# Patient Record
Sex: MALE | Race: WHITE | Employment: OTHER | URBAN - NONMETROPOLITAN AREA
[De-identification: names, ages, dates, MRNs, and addresses within clinical notes are randomized per-mention and may not be internally consistent; named-entity substitution may affect disease eponyms.]

---

## 2017-01-27 ENCOUNTER — TELEPHONE (OUTPATIENT)
Dept: CARDIOLOGY | Age: 77
End: 2017-01-27

## 2017-02-08 ENCOUNTER — OFFICE VISIT (OUTPATIENT)
Dept: CARDIOLOGY | Age: 77
End: 2017-02-08
Payer: MEDICARE

## 2017-02-08 VITALS
BODY MASS INDEX: 28.19 KG/M2 | WEIGHT: 186 LBS | SYSTOLIC BLOOD PRESSURE: 180 MMHG | HEIGHT: 68 IN | DIASTOLIC BLOOD PRESSURE: 88 MMHG | HEART RATE: 64 BPM

## 2017-02-08 DIAGNOSIS — I25.10 CORONARY ARTERY DISEASE INVOLVING NATIVE CORONARY ARTERY OF NATIVE HEART WITHOUT ANGINA PECTORIS: ICD-10-CM

## 2017-02-08 DIAGNOSIS — I10 ESSENTIAL HYPERTENSION: Primary | ICD-10-CM

## 2017-02-08 DIAGNOSIS — R55 SYNCOPE, UNSPECIFIED SYNCOPE TYPE: ICD-10-CM

## 2017-02-08 PROCEDURE — 3017F COLORECTAL CA SCREEN DOC REV: CPT | Performed by: CLINICAL NURSE SPECIALIST

## 2017-02-08 PROCEDURE — G8484 FLU IMMUNIZE NO ADMIN: HCPCS | Performed by: CLINICAL NURSE SPECIALIST

## 2017-02-08 PROCEDURE — 4040F PNEUMOC VAC/ADMIN/RCVD: CPT | Performed by: CLINICAL NURSE SPECIALIST

## 2017-02-08 PROCEDURE — 1123F ACP DISCUSS/DSCN MKR DOCD: CPT | Performed by: CLINICAL NURSE SPECIALIST

## 2017-02-08 PROCEDURE — 99203 OFFICE O/P NEW LOW 30 MIN: CPT | Performed by: CLINICAL NURSE SPECIALIST

## 2017-02-08 PROCEDURE — G8427 DOCREV CUR MEDS BY ELIG CLIN: HCPCS | Performed by: CLINICAL NURSE SPECIALIST

## 2017-02-08 PROCEDURE — 93000 ELECTROCARDIOGRAM COMPLETE: CPT | Performed by: CLINICAL NURSE SPECIALIST

## 2017-02-08 PROCEDURE — G8420 CALC BMI NORM PARAMETERS: HCPCS | Performed by: CLINICAL NURSE SPECIALIST

## 2017-02-08 PROCEDURE — G8598 ASA/ANTIPLAT THER USED: HCPCS | Performed by: CLINICAL NURSE SPECIALIST

## 2017-02-08 PROCEDURE — 4004F PT TOBACCO SCREEN RCVD TLK: CPT | Performed by: CLINICAL NURSE SPECIALIST

## 2017-02-08 RX ORDER — HYDROCHLOROTHIAZIDE 25 MG/1
25 TABLET ORAL DAILY
COMMUNITY
Start: 2017-01-20 | End: 2017-02-08 | Stop reason: ALTCHOICE

## 2017-02-08 RX ORDER — FUROSEMIDE 40 MG/1
40 TABLET ORAL DAILY
Qty: 60 TABLET | Refills: 3 | Status: SHIPPED | OUTPATIENT
Start: 2017-02-08 | End: 2017-07-19 | Stop reason: SINTOL

## 2017-02-08 RX ORDER — METOPROLOL SUCCINATE 50 MG/1
50 TABLET, EXTENDED RELEASE ORAL 2 TIMES DAILY
COMMUNITY
Start: 2017-01-20 | End: 2017-07-19 | Stop reason: DRUGHIGH

## 2017-02-08 RX ORDER — GABAPENTIN 300 MG/1
600 CAPSULE ORAL 3 TIMES DAILY
COMMUNITY
Start: 2017-01-20

## 2017-02-08 RX ORDER — GEMFIBROZIL 600 MG/1
600 TABLET, FILM COATED ORAL 2 TIMES DAILY
COMMUNITY
Start: 2017-01-20 | End: 2018-04-17 | Stop reason: CLARIF

## 2017-02-17 ENCOUNTER — TELEPHONE (OUTPATIENT)
Dept: CARDIOLOGY | Age: 77
End: 2017-02-17

## 2017-02-17 DIAGNOSIS — R79.89 ELEVATED SERUM CREATININE: Primary | ICD-10-CM

## 2017-02-21 DIAGNOSIS — R55 SYNCOPE AND COLLAPSE: ICD-10-CM

## 2017-02-21 DIAGNOSIS — I25.10 CORONARY ARTERY DISEASE INVOLVING NATIVE CORONARY ARTERY OF NATIVE HEART WITHOUT ANGINA PECTORIS: Primary | ICD-10-CM

## 2017-02-21 DIAGNOSIS — R55 SYNCOPE, UNSPECIFIED SYNCOPE TYPE: ICD-10-CM

## 2017-02-21 DIAGNOSIS — I10 ESSENTIAL HYPERTENSION: ICD-10-CM

## 2017-02-22 ENCOUNTER — HOSPITAL ENCOUNTER (OUTPATIENT)
Dept: NUCLEAR MEDICINE | Age: 77
Discharge: HOME | End: 2017-02-22
Payer: MEDICARE

## 2017-02-22 ENCOUNTER — HOSPITAL ENCOUNTER (OUTPATIENT)
Dept: NON INVASIVE DIAGNOSTICS | Age: 77
Discharge: HOME | End: 2017-02-22
Payer: MEDICARE

## 2017-02-22 DIAGNOSIS — I25.10 CORONARY ARTERY DISEASE INVOLVING NATIVE CORONARY ARTERY OF NATIVE HEART WITHOUT ANGINA PECTORIS: ICD-10-CM

## 2017-02-22 DIAGNOSIS — I10 ESSENTIAL HYPERTENSION: ICD-10-CM

## 2017-02-22 DIAGNOSIS — R55 SYNCOPE AND COLLAPSE: ICD-10-CM

## 2017-02-22 PROCEDURE — 93017 CV STRESS TEST TRACING ONLY: CPT

## 2017-02-22 PROCEDURE — 78452 HT MUSCLE IMAGE SPECT MULT: CPT

## 2017-02-22 PROCEDURE — 3430000000 HC RX DIAGNOSTIC RADIOPHARMACEUTICAL: Performed by: CLINICAL NURSE SPECIALIST

## 2017-02-22 PROCEDURE — 6360000002 HC RX W HCPCS: Performed by: CLINICAL NURSE SPECIALIST

## 2017-02-22 PROCEDURE — A9500 TC99M SESTAMIBI: HCPCS | Performed by: CLINICAL NURSE SPECIALIST

## 2017-02-22 RX ADMIN — TETRAKIS(2-METHOXYISOBUTYLISOCYANIDE)COPPER(I) TETRAFLUOROBORATE 30 MILLICURIE: 1 INJECTION, POWDER, LYOPHILIZED, FOR SOLUTION INTRAVENOUS at 14:31

## 2017-02-22 RX ADMIN — REGADENOSON 0.4 MG: 0.08 INJECTION, SOLUTION INTRAVENOUS at 14:31

## 2017-02-22 RX ADMIN — TETRAKIS(2-METHOXYISOBUTYLISOCYANIDE)COPPER(I) TETRAFLUOROBORATE 10 MILLICURIE: 1 INJECTION, POWDER, LYOPHILIZED, FOR SOLUTION INTRAVENOUS at 14:31

## 2017-06-13 ENCOUNTER — TELEPHONE (OUTPATIENT)
Dept: CARDIOLOGY | Age: 77
End: 2017-06-13

## 2017-07-19 ENCOUNTER — OFFICE VISIT (OUTPATIENT)
Dept: CARDIOLOGY | Age: 77
End: 2017-07-19
Payer: MEDICARE

## 2017-07-19 VITALS
HEIGHT: 68 IN | BODY MASS INDEX: 27.13 KG/M2 | SYSTOLIC BLOOD PRESSURE: 122 MMHG | DIASTOLIC BLOOD PRESSURE: 66 MMHG | WEIGHT: 179 LBS | HEART RATE: 72 BPM

## 2017-07-19 DIAGNOSIS — R42 DIZZINESS: ICD-10-CM

## 2017-07-19 DIAGNOSIS — I10 ESSENTIAL HYPERTENSION: ICD-10-CM

## 2017-07-19 DIAGNOSIS — I25.10 CORONARY ARTERY DISEASE INVOLVING NATIVE CORONARY ARTERY OF NATIVE HEART WITHOUT ANGINA PECTORIS: Primary | ICD-10-CM

## 2017-07-19 DIAGNOSIS — R94.39 POSITIVE CARDIAC STRESS TEST: ICD-10-CM

## 2017-07-19 PROCEDURE — 99214 OFFICE O/P EST MOD 30 MIN: CPT | Performed by: CLINICAL NURSE SPECIALIST

## 2017-07-19 PROCEDURE — G8598 ASA/ANTIPLAT THER USED: HCPCS | Performed by: CLINICAL NURSE SPECIALIST

## 2017-07-19 PROCEDURE — 3017F COLORECTAL CA SCREEN DOC REV: CPT | Performed by: CLINICAL NURSE SPECIALIST

## 2017-07-19 PROCEDURE — 1123F ACP DISCUSS/DSCN MKR DOCD: CPT | Performed by: CLINICAL NURSE SPECIALIST

## 2017-07-19 PROCEDURE — 1036F TOBACCO NON-USER: CPT | Performed by: CLINICAL NURSE SPECIALIST

## 2017-07-19 PROCEDURE — G8419 CALC BMI OUT NRM PARAM NOF/U: HCPCS | Performed by: CLINICAL NURSE SPECIALIST

## 2017-07-19 PROCEDURE — 4040F PNEUMOC VAC/ADMIN/RCVD: CPT | Performed by: CLINICAL NURSE SPECIALIST

## 2017-07-19 PROCEDURE — G8427 DOCREV CUR MEDS BY ELIG CLIN: HCPCS | Performed by: CLINICAL NURSE SPECIALIST

## 2017-07-19 RX ORDER — FERROUS SULFATE 325(65) MG
325 TABLET ORAL
COMMUNITY

## 2017-07-19 RX ORDER — METOPROLOL SUCCINATE 50 MG/1
50 TABLET, EXTENDED RELEASE ORAL 2 TIMES DAILY
COMMUNITY
End: 2018-03-09

## 2017-07-24 ENCOUNTER — HOSPITAL ENCOUNTER (OUTPATIENT)
Dept: VASCULAR LAB | Age: 77
Discharge: HOME | End: 2017-07-24
Payer: MEDICARE

## 2017-07-24 DIAGNOSIS — I25.10 CORONARY ARTERY DISEASE INVOLVING NATIVE CORONARY ARTERY OF NATIVE HEART WITHOUT ANGINA PECTORIS: ICD-10-CM

## 2017-07-24 DIAGNOSIS — R42 DIZZINESS: ICD-10-CM

## 2017-07-24 PROCEDURE — 93880 EXTRACRANIAL BILAT STUDY: CPT

## 2017-10-27 ENCOUNTER — TELEPHONE (OUTPATIENT)
Dept: CARDIOLOGY | Age: 77
End: 2017-10-27

## 2017-10-30 ENCOUNTER — TELEPHONE (OUTPATIENT)
Dept: CARDIOLOGY | Age: 77
End: 2017-10-30

## 2018-01-11 ENCOUNTER — HOSPITAL ENCOUNTER (OUTPATIENT)
Age: 78
Setting detail: OBSERVATION
Discharge: HOME | End: 2018-01-15
Attending: INTERNAL MEDICINE | Admitting: INTERNAL MEDICINE
Payer: MEDICARE

## 2018-01-11 DIAGNOSIS — N19 RENAL FAILURE, UNSPECIFIED CHRONICITY: ICD-10-CM

## 2018-01-11 LAB
GLUCOSE BLD-MCNC: 194 MG/DL (ref 70–99)
PERFORMED ON: ABNORMAL
TROPONIN: 0.1 NG/ML (ref 0–0.03)

## 2018-01-11 PROCEDURE — 2140000000 HC CCU INTERMEDIATE R&B

## 2018-01-11 PROCEDURE — 84484 ASSAY OF TROPONIN QUANT: CPT

## 2018-01-11 PROCEDURE — 93005 ELECTROCARDIOGRAM TRACING: CPT

## 2018-01-11 PROCEDURE — 2580000003 HC RX 258: Performed by: INTERNAL MEDICINE

## 2018-01-11 PROCEDURE — 6370000000 HC RX 637 (ALT 250 FOR IP): Performed by: INTERNAL MEDICINE

## 2018-01-11 PROCEDURE — 99220 PR INITIAL OBSERVATION CARE/DAY 70 MINUTES: CPT | Performed by: INTERNAL MEDICINE

## 2018-01-11 PROCEDURE — 36415 COLL VENOUS BLD VENIPUNCTURE: CPT

## 2018-01-11 PROCEDURE — 82948 REAGENT STRIP/BLOOD GLUCOSE: CPT

## 2018-01-11 RX ORDER — ERGOCALCIFEROL 1.25 MG/1
50000 CAPSULE ORAL WEEKLY
Status: DISCONTINUED | OUTPATIENT
Start: 2018-01-17 | End: 2018-01-15 | Stop reason: HOSPADM

## 2018-01-11 RX ORDER — ONDANSETRON 2 MG/ML
4 INJECTION INTRAMUSCULAR; INTRAVENOUS EVERY 6 HOURS PRN
Status: DISCONTINUED | OUTPATIENT
Start: 2018-01-11 | End: 2018-01-15 | Stop reason: HOSPADM

## 2018-01-11 RX ORDER — METOPROLOL SUCCINATE 50 MG/1
50 TABLET, EXTENDED RELEASE ORAL 2 TIMES DAILY
Status: DISCONTINUED | OUTPATIENT
Start: 2018-01-11 | End: 2018-01-15 | Stop reason: HOSPADM

## 2018-01-11 RX ORDER — FERROUS SULFATE 325(65) MG
325 TABLET ORAL
Status: DISCONTINUED | OUTPATIENT
Start: 2018-01-12 | End: 2018-01-15 | Stop reason: HOSPADM

## 2018-01-11 RX ORDER — ASPIRIN 81 MG/1
81 TABLET, CHEWABLE ORAL DAILY
Status: DISCONTINUED | OUTPATIENT
Start: 2018-01-12 | End: 2018-01-11 | Stop reason: SDUPTHER

## 2018-01-11 RX ORDER — ERGOCALCIFEROL 1.25 MG/1
50000 CAPSULE ORAL WEEKLY
COMMUNITY

## 2018-01-11 RX ORDER — GABAPENTIN 600 MG/1
600 TABLET ORAL 3 TIMES DAILY
Status: DISCONTINUED | OUTPATIENT
Start: 2018-01-11 | End: 2018-01-15 | Stop reason: HOSPADM

## 2018-01-11 RX ORDER — SODIUM CHLORIDE 0.9 % (FLUSH) 0.9 %
10 SYRINGE (ML) INJECTION EVERY 12 HOURS SCHEDULED
Status: DISCONTINUED | OUTPATIENT
Start: 2018-01-11 | End: 2018-01-15 | Stop reason: HOSPADM

## 2018-01-11 RX ORDER — ASPIRIN 81 MG/1
81 TABLET ORAL DAILY
Status: DISCONTINUED | OUTPATIENT
Start: 2018-01-12 | End: 2018-01-15 | Stop reason: HOSPADM

## 2018-01-11 RX ORDER — ACETAMINOPHEN 325 MG/1
650 TABLET ORAL EVERY 4 HOURS PRN
Status: DISCONTINUED | OUTPATIENT
Start: 2018-01-11 | End: 2018-01-15 | Stop reason: HOSPADM

## 2018-01-11 RX ORDER — LISINOPRIL 20 MG/1
40 TABLET ORAL DAILY
Status: DISCONTINUED | OUTPATIENT
Start: 2018-01-12 | End: 2018-01-15 | Stop reason: HOSPADM

## 2018-01-11 RX ORDER — SIMVASTATIN 40 MG
40 TABLET ORAL NIGHTLY
Status: DISCONTINUED | OUTPATIENT
Start: 2018-01-11 | End: 2018-01-15 | Stop reason: HOSPADM

## 2018-01-11 RX ORDER — GEMFIBROZIL 600 MG/1
600 TABLET, FILM COATED ORAL 2 TIMES DAILY
Status: DISCONTINUED | OUTPATIENT
Start: 2018-01-11 | End: 2018-01-15 | Stop reason: HOSPADM

## 2018-01-11 RX ORDER — METOCLOPRAMIDE 10 MG/1
10 TABLET ORAL
COMMUNITY

## 2018-01-11 RX ORDER — SODIUM CHLORIDE 0.9 % (FLUSH) 0.9 %
10 SYRINGE (ML) INJECTION PRN
Status: DISCONTINUED | OUTPATIENT
Start: 2018-01-11 | End: 2018-01-15 | Stop reason: HOSPADM

## 2018-01-11 RX ORDER — METOCLOPRAMIDE 10 MG/1
10 TABLET ORAL
Status: DISCONTINUED | OUTPATIENT
Start: 2018-01-11 | End: 2018-01-15 | Stop reason: HOSPADM

## 2018-01-11 RX ADMIN — METOCLOPRAMIDE HYDROCHLORIDE 10 MG: 10 TABLET ORAL at 21:55

## 2018-01-11 RX ADMIN — GEMFIBROZIL 600 MG: 600 TABLET ORAL at 21:55

## 2018-01-11 RX ADMIN — GABAPENTIN 600 MG: 600 TABLET, FILM COATED ORAL at 21:55

## 2018-01-11 RX ADMIN — METOPROLOL SUCCINATE 50 MG: 50 TABLET, EXTENDED RELEASE ORAL at 21:55

## 2018-01-11 RX ADMIN — SIMVASTATIN 40 MG: 40 TABLET, FILM COATED ORAL at 22:03

## 2018-01-11 RX ADMIN — Medication 10 ML: at 21:55

## 2018-01-11 ASSESSMENT — PAIN SCALES - GENERAL: PAINLEVEL_OUTOF10: 0

## 2018-01-12 PROBLEM — R07.9 CHEST PAIN: Status: ACTIVE | Noted: 2018-01-12

## 2018-01-12 LAB
ALBUMIN SERPL-MCNC: 4.3 G/DL (ref 3.5–5.2)
ALP BLD-CCNC: 83 U/L (ref 40–130)
ALT SERPL-CCNC: 9 U/L (ref 5–41)
ANION GAP SERPL CALCULATED.3IONS-SCNC: 16 MMOL/L (ref 7–19)
AST SERPL-CCNC: 16 U/L (ref 5–40)
BACTERIA: NEGATIVE /HPF
BASOPHILS ABSOLUTE: 0 K/UL (ref 0–0.2)
BASOPHILS RELATIVE PERCENT: 0.4 % (ref 0–1)
BILIRUB SERPL-MCNC: 0.5 MG/DL (ref 0.2–1.2)
BILIRUBIN URINE: NEGATIVE
BLOOD, URINE: ABNORMAL
BUN BLDV-MCNC: 53 MG/DL (ref 8–23)
CALCIUM SERPL-MCNC: 9.6 MG/DL (ref 8.8–10.2)
CHLORIDE BLD-SCNC: 99 MMOL/L (ref 98–111)
CHOLESTEROL, TOTAL: 208 MG/DL (ref 160–199)
CLARITY: CLEAR
CO2: 22 MMOL/L (ref 22–29)
COLOR: YELLOW
CREAT SERPL-MCNC: 1.6 MG/DL (ref 0.5–1.2)
CREATININE URINE: 42.1 MG/DL (ref 4.2–622)
EKG P AXIS: 62 DEGREES
EKG P-R INTERVAL: 208 MS
EKG Q-T INTERVAL: 408 MS
EKG QRS DURATION: 104 MS
EKG QTC CALCULATION (BAZETT): 436 MS
EKG T AXIS: 133 DEGREES
EOSINOPHILS ABSOLUTE: 0.2 K/UL (ref 0–0.6)
EOSINOPHILS RELATIVE PERCENT: 2.1 % (ref 0–5)
EPITHELIAL CELLS, UA: 0 /HPF (ref 0–5)
GFR NON-AFRICAN AMERICAN: 42
GLUCOSE BLD-MCNC: 236 MG/DL (ref 74–109)
GLUCOSE URINE: 100 MG/DL
HBA1C MFR BLD: 9.9 %
HCT VFR BLD CALC: 35.5 % (ref 42–52)
HDLC SERPL-MCNC: 34 MG/DL (ref 55–121)
HEMOGLOBIN: 11.2 G/DL (ref 14–18)
HYALINE CASTS: 0 /HPF (ref 0–8)
INR BLD: 1.18 (ref 0.88–1.18)
KETONES, URINE: NEGATIVE MG/DL
LDL CHOLESTEROL CALCULATED: ABNORMAL MG/DL
LDL CHOLESTEROL DIRECT: 123 MG/DL
LEUKOCYTE ESTERASE, URINE: NEGATIVE
LV EF: 43 %
LVEF MODALITY: NORMAL
LYMPHOCYTES ABSOLUTE: 2.1 K/UL (ref 1.1–4.5)
LYMPHOCYTES RELATIVE PERCENT: 27.1 % (ref 20–40)
MCH RBC QN AUTO: 28.1 PG (ref 27–31)
MCHC RBC AUTO-ENTMCNC: 31.5 G/DL (ref 33–37)
MCV RBC AUTO: 89.2 FL (ref 80–94)
MICROALBUMIN UR-MCNC: 6.8 MG/DL (ref 0–19)
MICROALBUMIN/CREAT UR-RTO: 161.5 MG/G
MONOCYTES ABSOLUTE: 0.5 K/UL (ref 0–0.9)
MONOCYTES RELATIVE PERCENT: 7 % (ref 0–10)
NEUTROPHILS ABSOLUTE: 4.8 K/UL (ref 1.5–7.5)
NEUTROPHILS RELATIVE PERCENT: 62.9 % (ref 50–65)
NITRITE, URINE: NEGATIVE
PDW BLD-RTO: 13.6 % (ref 11.5–14.5)
PH UA: 5.5
PLATELET # BLD: 231 K/UL (ref 130–400)
PMV BLD AUTO: 11.4 FL (ref 9.4–12.4)
POTASSIUM SERPL-SCNC: 5.3 MMOL/L (ref 3.5–5)
PRO-BNP: 3401 PG/ML (ref 0–1800)
PROTEIN UA: NEGATIVE MG/DL
PROTHROMBIN TIME: 15 SEC (ref 12–14.6)
RBC # BLD: 3.98 M/UL (ref 4.7–6.1)
RBC UA: 3 /HPF (ref 0–4)
SEDIMENTATION RATE, ERYTHROCYTE: 86 MM/HR (ref 0–15)
SODIUM BLD-SCNC: 137 MMOL/L (ref 136–145)
SODIUM URINE: 39 MMOL/L
SPECIFIC GRAVITY UA: 1.01
TOTAL CK: 158 U/L (ref 39–308)
TOTAL PROTEIN: 7.5 G/DL (ref 6.6–8.7)
TRIGL SERPL-MCNC: 404 MG/DL (ref 0–149)
TROPONIN: 0.08 NG/ML (ref 0–0.03)
TROPONIN: 0.1 NG/ML (ref 0–0.03)
TSH SERPL DL<=0.05 MIU/L-ACNC: 0.56 UIU/ML (ref 0.27–4.2)
UREA NITROGEN, UR: 425 MG/DL
UROBILINOGEN, URINE: 0.2 E.U./DL
VITAMIN B-12: 441 PG/ML (ref 211–946)
WBC # BLD: 7.6 K/UL (ref 4.8–10.8)
WBC UA: 0 /HPF (ref 0–5)

## 2018-01-12 PROCEDURE — 84300 ASSAY OF URINE SODIUM: CPT

## 2018-01-12 PROCEDURE — 82550 ASSAY OF CK (CPK): CPT

## 2018-01-12 PROCEDURE — 81405 MOPATH PROCEDURE LEVEL 6: CPT

## 2018-01-12 PROCEDURE — 93005 ELECTROCARDIOGRAM TRACING: CPT

## 2018-01-12 PROCEDURE — 83880 ASSAY OF NATRIURETIC PEPTIDE: CPT

## 2018-01-12 PROCEDURE — 84540 ASSAY OF URINE/UREA-N: CPT

## 2018-01-12 PROCEDURE — 99205 OFFICE O/P NEW HI 60 MIN: CPT | Performed by: PSYCHIATRY & NEUROLOGY

## 2018-01-12 PROCEDURE — 90686 IIV4 VACC NO PRSV 0.5 ML IM: CPT | Performed by: INTERNAL MEDICINE

## 2018-01-12 PROCEDURE — 97162 PT EVAL MOD COMPLEX 30 MIN: CPT

## 2018-01-12 PROCEDURE — 84484 ASSAY OF TROPONIN QUANT: CPT

## 2018-01-12 PROCEDURE — 82570 ASSAY OF URINE CREATININE: CPT

## 2018-01-12 PROCEDURE — 90670 PCV13 VACCINE IM: CPT | Performed by: INTERNAL MEDICINE

## 2018-01-12 PROCEDURE — 99225 PR SBSQ OBSERVATION CARE/DAY 25 MINUTES: CPT | Performed by: INTERNAL MEDICINE

## 2018-01-12 PROCEDURE — 85025 COMPLETE CBC W/AUTO DIFF WBC: CPT

## 2018-01-12 PROCEDURE — 6360000002 HC RX W HCPCS: Performed by: INTERNAL MEDICINE

## 2018-01-12 PROCEDURE — 82607 VITAMIN B-12: CPT

## 2018-01-12 PROCEDURE — 6370000000 HC RX 637 (ALT 250 FOR IP): Performed by: INTERNAL MEDICINE

## 2018-01-12 PROCEDURE — 93306 TTE W/DOPPLER COMPLETE: CPT

## 2018-01-12 PROCEDURE — 2580000003 HC RX 258: Performed by: INTERNAL MEDICINE

## 2018-01-12 PROCEDURE — 85610 PROTHROMBIN TIME: CPT

## 2018-01-12 PROCEDURE — 81406 MOPATH PROCEDURE LEVEL 7: CPT

## 2018-01-12 PROCEDURE — G8978 MOBILITY CURRENT STATUS: HCPCS

## 2018-01-12 PROCEDURE — 6370000000 HC RX 637 (ALT 250 FOR IP): Performed by: PSYCHIATRY & NEUROLOGY

## 2018-01-12 PROCEDURE — 85652 RBC SED RATE AUTOMATED: CPT

## 2018-01-12 PROCEDURE — G0378 HOSPITAL OBSERVATION PER HR: HCPCS

## 2018-01-12 PROCEDURE — 83921 ORGANIC ACID SINGLE QUANT: CPT

## 2018-01-12 PROCEDURE — 83721 ASSAY OF BLOOD LIPOPROTEIN: CPT

## 2018-01-12 PROCEDURE — 81001 URINALYSIS AUTO W/SCOPE: CPT

## 2018-01-12 PROCEDURE — 81403 MOPATH PROCEDURE LEVEL 4: CPT

## 2018-01-12 PROCEDURE — 81324 PMP22 GENE DUP/DELET: CPT

## 2018-01-12 PROCEDURE — 80061 LIPID PANEL: CPT

## 2018-01-12 PROCEDURE — G8979 MOBILITY GOAL STATUS: HCPCS

## 2018-01-12 PROCEDURE — 36415 COLL VENOUS BLD VENIPUNCTURE: CPT

## 2018-01-12 PROCEDURE — 80053 COMPREHEN METABOLIC PANEL: CPT

## 2018-01-12 PROCEDURE — G0009 ADMIN PNEUMOCOCCAL VACCINE: HCPCS | Performed by: INTERNAL MEDICINE

## 2018-01-12 PROCEDURE — 84443 ASSAY THYROID STIM HORMONE: CPT

## 2018-01-12 PROCEDURE — 83036 HEMOGLOBIN GLYCOSYLATED A1C: CPT

## 2018-01-12 PROCEDURE — G0008 ADMIN INFLUENZA VIRUS VAC: HCPCS | Performed by: INTERNAL MEDICINE

## 2018-01-12 PROCEDURE — 82043 UR ALBUMIN QUANTITATIVE: CPT

## 2018-01-12 RX ORDER — HYDROCODONE BITARTRATE AND ACETAMINOPHEN 5; 325 MG/1; MG/1
2 TABLET ORAL EVERY 6 HOURS PRN
Status: DISCONTINUED | OUTPATIENT
Start: 2018-01-12 | End: 2018-01-15 | Stop reason: HOSPADM

## 2018-01-12 RX ORDER — TAMSULOSIN HYDROCHLORIDE 0.4 MG/1
0.4 CAPSULE ORAL DAILY
Status: DISCONTINUED | OUTPATIENT
Start: 2018-01-12 | End: 2018-01-15 | Stop reason: HOSPADM

## 2018-01-12 RX ADMIN — METOCLOPRAMIDE HYDROCHLORIDE 10 MG: 10 TABLET ORAL at 11:11

## 2018-01-12 RX ADMIN — ENOXAPARIN SODIUM 40 MG: 40 INJECTION SUBCUTANEOUS at 09:09

## 2018-01-12 RX ADMIN — SIMVASTATIN 40 MG: 40 TABLET, FILM COATED ORAL at 20:30

## 2018-01-12 RX ADMIN — Medication 10 ML: at 09:28

## 2018-01-12 RX ADMIN — GABAPENTIN 600 MG: 600 TABLET, FILM COATED ORAL at 09:14

## 2018-01-12 RX ADMIN — METOCLOPRAMIDE HYDROCHLORIDE 10 MG: 10 TABLET ORAL at 06:07

## 2018-01-12 RX ADMIN — GABAPENTIN 600 MG: 600 TABLET, FILM COATED ORAL at 14:59

## 2018-01-12 RX ADMIN — METOPROLOL SUCCINATE 50 MG: 50 TABLET, EXTENDED RELEASE ORAL at 20:30

## 2018-01-12 RX ADMIN — GABAPENTIN 600 MG: 600 TABLET, FILM COATED ORAL at 20:30

## 2018-01-12 RX ADMIN — TAMSULOSIN HYDROCHLORIDE 0.4 MG: 0.4 CAPSULE ORAL at 11:11

## 2018-01-12 RX ADMIN — GEMFIBROZIL 600 MG: 600 TABLET ORAL at 09:14

## 2018-01-12 RX ADMIN — METOPROLOL SUCCINATE 50 MG: 50 TABLET, EXTENDED RELEASE ORAL at 09:13

## 2018-01-12 RX ADMIN — PNEUMOCOCCAL 13-VALENT CONJUGATE VACCINE 0.5 ML: 2.2; 2.2; 2.2; 2.2; 2.2; 4.4; 2.2; 2.2; 2.2; 2.2; 2.2; 2.2; 2.2 INJECTION, SUSPENSION INTRAMUSCULAR at 09:17

## 2018-01-12 RX ADMIN — Medication 10 ML: at 20:31

## 2018-01-12 RX ADMIN — SERTRALINE HYDROCHLORIDE 150 MG: 100 TABLET ORAL at 09:13

## 2018-01-12 RX ADMIN — LISINOPRIL 40 MG: 20 TABLET ORAL at 09:09

## 2018-01-12 RX ADMIN — FERROUS SULFATE TAB 325 MG (65 MG ELEMENTAL FE) 325 MG: 325 (65 FE) TAB at 09:14

## 2018-01-12 RX ADMIN — METOCLOPRAMIDE HYDROCHLORIDE 10 MG: 10 TABLET ORAL at 16:46

## 2018-01-12 RX ADMIN — Medication 81 MG: at 09:09

## 2018-01-12 RX ADMIN — GEMFIBROZIL 600 MG: 600 TABLET ORAL at 20:30

## 2018-01-12 RX ADMIN — INFLUENZA A VIRUS A/CALIFORNIA/7/2009 X-179A (H1N1) ANTIGEN (FORMALDEHYDE INACTIVATED), INFLUENZA A VIRUS A/TEXAS/50/2012 X-223A (H3N2) ANTIGEN (FORMALDEHYDE INACTIVATED), INFLUENZA B VIRUS B/MASSACHUSETTS/2/2012 BX-51B ANTIGEN (FORMALDEHYDE INACTIVATED), AND INFLUENZA B VIRUS B/BRISBANE/60/2008 ANTIGEN (FORMALDEHYDE INACTIVATED) 0.5 ML: 15; 15; 15; 15 INJECTION, SUSPENSION INTRAMUSCULAR at 09:18

## 2018-01-12 ASSESSMENT — PAIN SCALES - GENERAL: PAINLEVEL_OUTOF10: 0

## 2018-01-12 NOTE — CONSULTS
Inpatient consult to Nephrology  Consult performed by: Shey Parkinson ordered by: Ángel Moon  Assessment/Recommendations: Renal Consult Note    Thank you to requesting provider: Dr Mariia Oseguera, for asking us to see Olaf Dunbar    Reason for consultation:  CKD stage 3, SARA    Chief Complaint:  Weakness, dysuria    History of Presenting Illness     Patient is a 79 yo pleasant man who has benign hypertension, and type 2 diabetes (x 30 years). Lately, his diabetes has not been well controlled. He was having gradual onset of dysuria, difficulty voiding along with urinary frequency. He was also getting weak mostly in his lower extremities. He reported to [facility]. His initial workup showed elevated cardiac enzymes. It was reported that his creatinnine in the peripheral hospital was 2, but at out institution, he was 1.6. Renal service was consulted for CKD stage 3 management. Patient was unaware of previous CKD.      Past Medical/Surgical History     Active Ambulatory Problems    CAD (coronary artery disease)      Hypertension      Hyperlipidemia      Type II or unspecified type diabetes mellitus without mention of complication, not stated as uncontrolled    Resolved Ambulatory Problems  No Resolved Ambulatory Problems  Past Medical History:  No date: Benign prostatic hypertrophy  No date: Blind left eye  No date: CAD (coronary artery disease)  No date: Cerebrovascular disease  No date: Diabetic retinopathy of right eye ([address]  No date: Hyperlipidemia  No date: Hypertension  No date: Refusal of blood transfusions as patient is Je*  No date: Type II or unspecified type diabetes mellitus *      Review of Systems    Constitutional: + weight loss, no fever/chills  Eyes:  No eye pain, no eye redness  Cardiovascular:  No chest pain, no worsening of edema  Respiratory:  No hemoptysis, no stidor  Gastrointestinal:  No blood in stool, no n/v, + diarrhea  Genitoruinary:  No hematuria, + Dulaglutide SOPN 1.5 mg, 1.5 mg, Subcutaneous, Weekly, LINDSAY Sherman MD  metoclopramide (REGLAN) tablet 10 mg, 10 mg, Oral, TID AC, LINDSAY Sherman MD, 10 mg at 01/12/18 1646  (START ON 1/17/2018) vitamin D (ERGOCALCIFEROL) capsule 50,000 Units, 50,000 Units, Oral, Weekly, LINDSAY Sherman MD  Outpatient Prescriptions Marked as Taking for the 1/11/18 encounter [facility] Encounter):  Dulaglutide (TRULICITY) 1.5 BZ/4.4XG SOPN, Inject 1.5 mg into the skin once a week, Disp: , Rfl:   metoclopramide (REGLAN) 10 MG tablet, Take 10 mg by mouth 3 times daily (before meals), Disp: , Rfl:   vitamin D (ERGOCALCIFEROL) 67926 units CAPS capsule, Take 50,000 Units by mouth once a week, Disp: , Rfl:         Allergies  Review of patient's allergies indicates no known allergies. Family History    Review of patient's family history indicates:    Cancer                         Other                     Diabetes                       Other                     High Blood Pressure            Other                     Heart Disease                  Other                     Family history negative for kidney disease. Social History     Social History    Marital status:              Spouse name:                       Years of education:                 Number of children:               Social History Main Topics    Smoking status: Never Smoker                                                                Smokeless tobacco: Never Used                        Alcohol use: No              Drug use: No              Sexual activity: Not Currently     Partners with: Female        Physical Exam    Blood pressure (!) 161/79, pulse 78, temperature 97.8 °F (36.6 °C), temperature source Temporal, resp. rate 24, height 5' 8\" (1.727 m), weight 156 lb 1 oz (70.8 kg), SpO2 97 %.     General:  NAD, A+Ox3, ill-appearing, normal body habitus  HEENT:  PERRL, EOMI  Neck:  Supple, normal range of movement  Chest:  CTAB, good respiratory effort, good air movement  CV:  RRR, 2/6 soft systolic murmur  Abdomen:  NTND, soft, +BS, no hepatosplenomegaly  Extremities:  No peripheral edema  Neurological:  Moving all four extremities,   Lymphatics:  No palpable lymph nodes   Skin:  No rash, no jaundice  Psychiatric:  Normal insight and judgement, good recall    Data                    01/12/18                       0225          WBC          7.6           HGB          11.2*         HCT          35.5*         MCV          89.2          PLT          231                           01/12/18                       0817          NA           137           K            5.3*          CL           99            CO2          22            GLUCOSE      236*          BUN          53*           CREATININE   1.6*          LABGLOM      42*             Assessment:  -SARA (likelty prerenal azotemia)  -CKD stage 3  -Type 2 DM (uncontrolled)  -Peripheral neuropathy  -Dysuria (?BPH)  -Hyperkalemia      Plan:  I will recheck BMP. Will check urine microalbuminuria, PTH, uric acid, urine lytes and get a renal US. No meds changes.       Thank you for asking us to participate in the management of your patient, please do not hesitate to contact me for any concerns regarding my recommendations as outlined above.    -----------------------------  Electronically signed by Maikol Katz MD on 1/12/18 at 5:45 PM

## 2018-01-12 NOTE — H&P
[facility]      History and Physical       Date of Admission:  1/11/2018  4:11 PM    Date of Initially Being Seen / Consultation:  1/11/18    Cardiologist:  LINDSAY Alex MD    Attending: Dr. Rochelle Garcia      PCP:  Alix Edmonds    Reason for Consultation or Admission / Chief Complaint:  weakness    SUBJECTIVE AND HISTORY OF PRESENT ILLNESS:    Source of the history:  Patient, family, previous inpatient and outpatient records in [facility], and from [facility] records    Manuel Plasencia is a 78 y.o. male who presents to [facility] PCU with symptoms / signs / problem or diagnosis of elevated troponin. The patient presented to [facility] with some vague complaints of weakness and fatigue and in the process of his evaluation was noted to have elevated troponin level. However his creatinine is also elevated at over 2. I was called to accept the patient in admission. I have had a rather difficult time obtaining a history from this patient. His mental status appears to be a bit impaired and he is asking his relatives who are present for answers to our questions. He does state that he has been extremely weak and not mobile aand has been relying on a walker. He has not had any recent anginal type chest pain. He has not had any PND or orthopnea.       CARDIAC RISK PROFILE:    Risk Factor Yes / No / Unknown       Cigarette Use See below   Diabetes Mellitus See below   Family History of CAD See below   Hypercholesteremia See below   Hypertension See below          Cardiac Specific Problems:    Specialty Problems        Cardiology Problems    CAD (coronary artery disease)        Hypertension                PRIOR CARDIAC PROBLEM LIST  (IF APPLICABLE):    As noted above      Past Medical History:  Past Medical History:   Diagnosis Date    Benign prostatic hypertrophy     Blind left eye     CAD (coronary artery disease)     Cerebrovascular disease     history TIA    Diabetic interpreted the results of the following diagnostic testing      EKG and or Telemetry:  which was personally reviewed me:  sinus rhythm, 81 bpm    Troponin:  Positive in [place] for myocardial necrosis    CBC: No results for input(s): WBC, HGB, HCT, MCV, PLT in the last 72 hours. BMP: No results for input(s): NA, K, CL, CO2, PHOS, BUN, CREATININE in the last 72 hours. Invalid input(s): CA  Cardiac Enzymes:   Recent Labs      01/11/18   1816   TROPONINI  0.10*     PT/INR: No results for input(s): PROTIME, INR in the last 72 hours. APTT: No results for input(s): APTT in the last 72 hours. Liver Profile:  Lab Results   Component Value Date    AST 16 04/20/2011    ALT 20 04/20/2011    ALKPHOS 53 04/20/2011   No results found for: CHOL, HDL, TRIG  TSH:  No results found for: TSH  UA:   Lab Results   Component Value Date    COLORU GREEN 04/20/2011    PHUR 5.5 04/20/2011    LEUKOCYTESUR NEGATIVE 04/20/2011    UROBILINOGEN 0.2 04/20/2011    BILIRUBINUR NEGATIVE 04/20/2011    GLUCOSEU NEGATIVE 04/20/2011             ALL THE CARDIOLOGY PROBLEMS ARE LISTED ABOVE; HOWEVER, THE FOLLOWING SPECIFIC CARDIAC PROBLEMS WERE ADDRESSED AND TREATED DURING THE HOSPITAL VISIT TODAY:       Cardiac Specific Problem / Diagnosis  Discussion / Medical Decision Making Plan          1. Elevated troponin level  are worsening   This is possibly incidental to his renal disease We will trend it and consider ischemic study. He is known to have coronary artery disease and has had bypass surgery in the past          2. Weakness and muscle wasting and malnutrition  are worsening   He attributes this to his diabetes which may be correct. I think we need to get a neurology consultation as well as physical therapy consult. 3. Renal failure  are worsening   Will probably wind up needing a nephrology consult        PLAN:    1. Continue present medications except for changes as noted above  2. Continue to monitor rhythm  3.  Further

## 2018-01-12 NOTE — CONSULTS
mL, 30 mL, Oral, Daily PRN, LINDSAY Pack MD    ondansetron [facility]) injection 4 mg, 4 mg, Intravenous, Q6H PRN, LINDSAY Pack MD    enoxaparin (LOVENOX) injection 40 mg, 40 mg, Subcutaneous, Daily, LINDSAY Pack MD, 40 mg at 01/12/18 0909    simvastatin (ZOCOR) tablet 40 mg, 40 mg, Oral, Nightly, LINDSAY Pack MD, 40 mg at 01/11/18 2203    sertraline (ZOLOFT) tablet 150 mg, 150 mg, Oral, Daily, LINDSAY Pack MD, 150 mg at 01/12/18 0913    aspirin EC tablet 81 mg, 81 mg, Oral, Daily, LINDSAY Pack MD, 81 mg at 01/12/18 0909    lisinopril (PRINIVIL;ZESTRIL) tablet 40 mg, 40 mg, Oral, Daily, LINDSAY Pack MD, 40 mg at 01/12/18 0909    gabapentin (NEURONTIN) tablet 600 mg, 600 mg, Oral, TID, LINDSAY Pack MD, 600 mg at 01/12/18 0914    gemfibrozil (LOPID) tablet 600 mg, 600 mg, Oral, BID, LINDSAY Pack MD, 600 mg at 01/12/18 0914    metoprolol succinate (TOPROL XL) extended release tablet 50 mg, 50 mg, Oral, BID, LINDSAY Pack MD, 50 mg at 01/12/18 0913    ferrous sulfate tablet 325 mg, 325 mg, Oral, Daily with breakfast, LINDSAY Pack MD, 325 mg at 01/12/18 0914    [START ON 1/18/2018] Dulaglutide SOPN 1.5 mg, 1.5 mg, Subcutaneous, Weekly, LINDSAY Pack MD    metoclopramide [facility]) tablet 10 mg, 10 mg, Oral, TID AC, LINDSAY Pack MD, 10 mg at 01/12/18 1413    [START ON 1/17/2018] vitamin D (ERGOCALCIFEROL) capsule 50,000 Units, 50,000 Units, Oral, Weekly, LINDSAY Pack MD  Allergies: Review of patient's allergies indicates no known allergies. Social History:   TOBACCO:  reports that he has never smoked. He has never used smokeless tobacco.  ETOH:  reports that he does not drink alcohol. Family History:       Problem Relation Age of Onset    Cancer Other     Diabetes Other     High Blood Pressure Other     Heart Disease Other      ? ?   Physical Exam:    Vitals: BP (!) 144/72   Pulse 80   Temp 97.9 °F (36.6 °C) HGB  11.2*   PLT  231     BMP: No results for input(s): NA, K, CL, CO2, BUN, CREATININE, GLUCOSE in the last 72 hours. Hepatic: No results for input(s): AST, ALT, ALB, BILITOT, ALKPHOS in the last 72 hours. Lipids:   Recent Labs      01/12/18 0225   CHOL  208*   HDL  34*     INR:   Recent Labs      01/12/18 0225   INR  1.18     ?  ? Assessment and Plan   1. Neuropathy. 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 This could be due to chronically poorly controlled diabetes however it is somewhat worse than is typically seen. He may have an underlying additional reason for neuropathy and I have also considered Charcot-Lindsey-Tooth or other similar type hereditary conditions. He has no evidence of a myopathy or any spinal cord dysfunction on examination. We would not be able to discern any hyperreflexia however given his severe neuropathy even if he were to have it. No clear cortical dysfunction that would appear new. Case discussed with primary care physician. We'll get PT and OT to see and evaluate. We'll also check his A1c, B12, SPEP and a few other labs including a CPK. Will send for blood work to "[facility] labs to help rule out CMT. We will add Flomax to see if this helps with his bladder. I have asked the nurse to do a bladder scan.   ?  ?      Lizabeth Barger MD  Board Certified in Neurology

## 2018-01-13 ENCOUNTER — APPOINTMENT (OUTPATIENT)
Dept: ULTRASOUND IMAGING | Age: 78
End: 2018-01-13
Attending: INTERNAL MEDICINE
Payer: MEDICARE

## 2018-01-13 LAB
ANION GAP SERPL CALCULATED.3IONS-SCNC: 14 MMOL/L (ref 7–19)
BUN BLDV-MCNC: 49 MG/DL (ref 8–23)
CALCIUM SERPL-MCNC: 8.7 MG/DL (ref 8.8–10.2)
CHLORIDE BLD-SCNC: 99 MMOL/L (ref 98–111)
CO2: 22 MMOL/L (ref 22–29)
CREAT SERPL-MCNC: 1.4 MG/DL (ref 0.5–1.2)
GFR NON-AFRICAN AMERICAN: 49
GLUCOSE BLD-MCNC: 293 MG/DL (ref 74–109)
PARATHYROID HORMONE INTACT: 85 PG/ML (ref 15–65)
POTASSIUM SERPL-SCNC: 4.7 MMOL/L (ref 3.5–5)
SODIUM BLD-SCNC: 135 MMOL/L (ref 136–145)
TROPONIN: 0.06 NG/ML (ref 0–0.03)
URIC ACID, SERUM: 6.9 MG/DL (ref 3.4–7)

## 2018-01-13 PROCEDURE — 99213 OFFICE O/P EST LOW 20 MIN: CPT | Performed by: PSYCHIATRY & NEUROLOGY

## 2018-01-13 PROCEDURE — 2580000003 HC RX 258: Performed by: INTERNAL MEDICINE

## 2018-01-13 PROCEDURE — G0378 HOSPITAL OBSERVATION PER HR: HCPCS

## 2018-01-13 PROCEDURE — 99225 PR SBSQ OBSERVATION CARE/DAY 25 MINUTES: CPT | Performed by: INTERNAL MEDICINE

## 2018-01-13 PROCEDURE — 96372 THER/PROPH/DIAG INJ SC/IM: CPT

## 2018-01-13 PROCEDURE — 6360000002 HC RX W HCPCS: Performed by: INTERNAL MEDICINE

## 2018-01-13 PROCEDURE — 6370000000 HC RX 637 (ALT 250 FOR IP): Performed by: PSYCHIATRY & NEUROLOGY

## 2018-01-13 PROCEDURE — 80048 BASIC METABOLIC PNL TOTAL CA: CPT

## 2018-01-13 PROCEDURE — 97116 GAIT TRAINING THERAPY: CPT

## 2018-01-13 PROCEDURE — 83970 ASSAY OF PARATHORMONE: CPT

## 2018-01-13 PROCEDURE — 36415 COLL VENOUS BLD VENIPUNCTURE: CPT

## 2018-01-13 PROCEDURE — 76770 US EXAM ABDO BACK WALL COMP: CPT

## 2018-01-13 PROCEDURE — 84484 ASSAY OF TROPONIN QUANT: CPT

## 2018-01-13 PROCEDURE — 84550 ASSAY OF BLOOD/URIC ACID: CPT

## 2018-01-13 PROCEDURE — 6370000000 HC RX 637 (ALT 250 FOR IP): Performed by: INTERNAL MEDICINE

## 2018-01-13 RX ADMIN — GABAPENTIN 600 MG: 600 TABLET, FILM COATED ORAL at 20:27

## 2018-01-13 RX ADMIN — TAMSULOSIN HYDROCHLORIDE 0.4 MG: 0.4 CAPSULE ORAL at 08:36

## 2018-01-13 RX ADMIN — GEMFIBROZIL 600 MG: 600 TABLET ORAL at 20:26

## 2018-01-13 RX ADMIN — METOPROLOL SUCCINATE 50 MG: 50 TABLET, EXTENDED RELEASE ORAL at 20:27

## 2018-01-13 RX ADMIN — SIMVASTATIN 40 MG: 40 TABLET, FILM COATED ORAL at 20:27

## 2018-01-13 RX ADMIN — METOCLOPRAMIDE HYDROCHLORIDE 10 MG: 10 TABLET ORAL at 06:01

## 2018-01-13 RX ADMIN — ENOXAPARIN SODIUM 40 MG: 40 INJECTION SUBCUTANEOUS at 08:36

## 2018-01-13 RX ADMIN — METOCLOPRAMIDE HYDROCHLORIDE 10 MG: 10 TABLET ORAL at 13:43

## 2018-01-13 RX ADMIN — Medication 10 ML: at 20:28

## 2018-01-13 RX ADMIN — METOPROLOL SUCCINATE 50 MG: 50 TABLET, EXTENDED RELEASE ORAL at 08:36

## 2018-01-13 RX ADMIN — Medication 10 ML: at 08:36

## 2018-01-13 RX ADMIN — METOCLOPRAMIDE HYDROCHLORIDE 10 MG: 10 TABLET ORAL at 16:56

## 2018-01-13 RX ADMIN — SERTRALINE HYDROCHLORIDE 150 MG: 100 TABLET ORAL at 08:36

## 2018-01-13 RX ADMIN — FERROUS SULFATE TAB 325 MG (65 MG ELEMENTAL FE) 325 MG: 325 (65 FE) TAB at 08:36

## 2018-01-13 RX ADMIN — GABAPENTIN 600 MG: 600 TABLET, FILM COATED ORAL at 08:36

## 2018-01-13 RX ADMIN — GABAPENTIN 600 MG: 600 TABLET, FILM COATED ORAL at 13:42

## 2018-01-13 RX ADMIN — Medication 81 MG: at 08:36

## 2018-01-13 RX ADMIN — GEMFIBROZIL 600 MG: 600 TABLET ORAL at 08:36

## 2018-01-13 NOTE — PROGRESS NOTES
[facility]  Progress Note                            Date:  1/13/2018  Patient: Oh Cannon  Admission:  1/11/2018  4:11 PM  Admit DX: Chest pain [R07.9]  Age:  78 y. o., 1940     LOS: 1 day     Reason for evaluation:   ischemic heart disease, coronary artery disease and CABG      SUBJECTIVE:    The patient was seen and examined. Notes and labs reviewed. There Were no complications over night. Patient's cardiac review of systems: negative. The patient is generally feeling improved. Sitting in a chair      OBJECTIVE:    Telemetry: sinus rhythm  BP (!) 101/58   Pulse 69   Temp 99.3 °F (37.4 °C) (Temporal)   Resp 20   Ht 5' 8\" (1.727 m)   Wt 157 lb 6.4 oz (71.4 kg)   SpO2 93%   BMI 23.93 kg/m²     Intake/Output Summary (Last 24 hours) at 01/13/18 1145  Last data filed at 01/13/18 0528   Gross per 24 hour   Intake              640 ml   Output             1050 ml   Net             -410 ml       Labs:   CBC:   Recent Labs      01/12/18 0225   WBC  7.6   HGB  11.2*   HCT  35.5*   PLT  231     BMP: Recent Labs      01/12/18   0817  01/13/18   0208   NA  137  135*   K  5.3*  4.7   CO2  22  22   BUN  53*  49*   CREATININE  1.6*  1.4*   LABGLOM  42*  49*   GLUCOSE  236*  293*     BNP: No results for input(s): BNP in the last 72 hours. PT/INR:   Recent Labs      01/12/18 0225   PROTIME  15.0*   INR  1.18     APTT:No results for input(s): APTT in the last 72 hours.   CARDIAC ENZYMES:  Recent Labs      01/11/18   1816  01/12/18   0225  01/12/18   0817  01/12/18   1032   CKTOTAL   --    --    --   158   TROPONINI  0.10*  0.10*  0.08*   --      FASTING LIPID PANEL:  Lab Results   Component Value Date    HDL 34 01/12/2018    LDLDIRECT 123 01/12/2018    LDLCALC see below 01/12/2018    TRIG 404 01/12/2018     LIVER PROFILE:  Recent Labs      01/12/18 0817   AST  16   ALT  9   LABALBU  4.3           PFSH:  No change    ROSS:  No change      Physical Examination:  BP (!) 101/58 disease [facility])      Priority: High    Chest pain 01/12/2018     Priority: Low    CAD (coronary artery disease)      Priority: Low    Hypertension      Priority: Low    Hyperlipidemia      Priority: Low    Type II or unspecified type diabetes mellitus without mention of complication, not stated as uncontrolled      Priority: Low     on insulin       Transferred from [facility] with elevated troponin levels but has not had any recent anginal symptoms. Did have renal failure however. PLAN:    1. Continue present medications  2. appreciate consultants advice      Please see orders. Discussed with patient and nursing.     Negrito Toney MD     [facility]

## 2018-01-13 NOTE — PROGRESS NOTES
Patient:   Pierre Jolley  MR#:    [id number]   Room:    [facility]/715-02   YOB: 1940  Date of Progress Note: 1/13/2018  Time of Note                           8:48 AM  Consulting Physician:   Teena Mars M.D. Attending Physician:  LINDSAY Khan MD     CHIEF COMPLAINT: Weakness and unsteadiness on his feet  ? Subjective-  This 78 y.o. male who presents with elevated troponin from outside hospital. He is admitted to cardiology. I'm asked to see him for complaints of slow progressive weakness. He says he has had these problems for \"years\". He has severe wasting of his hands and weakness in his distal lower extremities. He has been using a walker for quite some time. He also has numbness and tingling in his toes and feet and says he can't feel anything down there. He has chronically poorly controlled diabetes apparently. He does have a history of  coronary artery disease. Has recently been found to have gastroparesis and a bezoar and was placed on Reglan about a month ago. Bladder difficulties have resolved with Flomax. He chronically has some problems with diarrhea and he says. Unclear if he has any trouble with sweating or producing tears. Denies any significant pain in his arms or legs per se. No double vision or clear trouble swallowing although he was having some choking a few months ago before he got put on Reglan. He takes Zocor and gemfibrozil chronically. Denies a family history of neuropathy.   No new neurological difficulties overnight  REVIEW OF SYSTEMS:  Constitutional: No fevers No chills  Neck:No stiffness  Respiratory: No shortness of breath  Cardiovascular: No chest pain No palpitations  Gastrointestinal: No abdominal pain    Genitourinary: No Dysuria  Neurological: No headache, no confusion      PHYSICAL EXAM:  BP (!) 101/58   Pulse 69   Temp 99.3 °F (37.4 °C) (Temporal)   Resp 20   Ht 5' 8\" (1.727 m)   Wt 157 lb 6.4 oz (71.4 kg)   SpO2 93%   BMI 23.93 kg/m²     Constitutional:

## 2018-01-13 NOTE — PROGRESS NOTES
Subcutaneous Daily LINDSAY Tellez MD   40 mg at 01/13/18 0836    simvastatin (ZOCOR) tablet 40 mg  40 mg Oral Nightly LINDSAY Tellez MD   40 mg at 01/12/18 2030    sertraline (ZOLOFT) tablet 150 mg  150 mg Oral Daily LINDSAY Tellez MD   150 mg at 01/13/18 0836    aspirin EC tablet 81 mg  81 mg Oral Daily LINDSAY Tellez MD   81 mg at 01/13/18 0836    lisinopril (PRINIVIL;ZESTRIL) tablet 40 mg  40 mg Oral Daily LINDSAY eTllez MD   40 mg at 01/12/18 0909    gabapentin (NEURONTIN) tablet 600 mg  600 mg Oral TID LINDSAY Tellez MD   600 mg at 01/13/18 0836    gemfibrozil (LOPID) tablet 600 mg  600 mg Oral BID LINDSAY Tellez MD   600 mg at 01/13/18 0836    metoprolol succinate (TOPROL XL) extended release tablet 50 mg  50 mg Oral BID LINDSAY Tellez MD   50 mg at 01/13/18 0429    ferrous sulfate tablet 325 mg  325 mg Oral Daily with breakfast LINDSAY Tellez MD   325 mg at 01/13/18 0836    [START ON 1/18/2018] Dulaglutide SOPN 1.5 mg  1.5 mg Subcutaneous Weekly LINDSAY Tellez MD        metoclopramide [facility]) tablet 10 mg  10 mg Oral TID AC LINDSAY Tellez MD   10 mg at 01/13/18 0601    [START ON 1/17/2018] vitamin D (ERGOCALCIFEROL) capsule 50,000 Units  50,000 Units Oral Weekly LINDSAY Tellez MD           Past Medical History:  Past Medical History:   Diagnosis Date    Benign prostatic hypertrophy     Blind left eye     CAD (coronary artery disease)     Cerebrovascular disease     history TIA    Diabetic retinopathy of right eye ([address]     Hyperlipidemia     Hypertension     Refusal of blood transfusions as patient is Mu-ism     Type II or unspecified type diabetes mellitus without mention of complication, not stated as uncontrolled     on insulin       Past Surgical History:  Past Surgical History:   Procedure Laterality Date    APPENDECTOMY      CHOLECYSTECTOMY      COLON SURGERY      partial colon resection    CORONARY ARTERY BYPASS GRAFT  4/21/2011    3V OPCAB: LIMA-LAD, SVG-OM, distal RCA  ([facility])    EYE SURGERY      bilateral cataract surgery       Family History  Family History   Problem Relation Age of Onset    Cancer Other     Diabetes Other     High Blood Pressure Other     Heart Disease Other        Social History  Social History     Social History    Marital status:      Spouse name: N/A    Number of children: N/A    Years of education: N/A     Occupational History    Not on file. Social History Main Topics    Smoking status: Never Smoker    Smokeless tobacco: Never Used    Alcohol use No    Drug use: No    Sexual activity: Not Currently     Partners: Female     Other Topics Concern    Not on file     Social History Narrative    No narrative on file         Review of Systems:  History obtained from chart review and the patient  General ROS: No fever or chills  Respiratory ROS: No cough, shortness of breath, wheezing  Cardiovascular ROS: no chest pain or dyspnea on exertion  Gastrointestinal ROS: No abdominal pain or melena  Genito-Urinary ROS: + dysuria, no hematuria  Musculoskeletal ROS: No joint pain or swelling         Objective:  Blood pressure (!) 101/58, pulse 69, temperature 99.3 °F (37.4 °C), temperature source Temporal, resp. rate 20, height 5' 8\" (1.727 m), weight 157 lb 6.4 oz (71.4 kg), SpO2 93 %.     Intake/Output Summary (Last 24 hours) at 01/13/18 1139  Last data filed at 01/13/18 0528   Gross per 24 hour   Intake              640 ml   Output             1050 ml   Net             -410 ml     General: alert and oriented x3   Chest:  clear to auscultation bilaterally without respiratory distress  CVS: regular rate and rhythm  Abdominal: soft, nontender, normal bowel sounds  Extremities: no cyanosis or edema  Skin: warm and dry without rash    Labs:  BMP: Recent Labs      01/12/18   0817  01/13/18   0208   NA  137  135*   K  5.3*  4.7   CL  99  99   CO2  22  22   BUN  53*  49*   CREATININE

## 2018-01-14 LAB
ANION GAP SERPL CALCULATED.3IONS-SCNC: 14 MMOL/L (ref 7–19)
BUN BLDV-MCNC: 38 MG/DL (ref 8–23)
CALCIUM SERPL-MCNC: 8.6 MG/DL (ref 8.8–10.2)
CHLORIDE BLD-SCNC: 100 MMOL/L (ref 98–111)
CO2: 23 MMOL/L (ref 22–29)
CREAT SERPL-MCNC: 1.3 MG/DL (ref 0.5–1.2)
GFR NON-AFRICAN AMERICAN: 54
GLUCOSE BLD-MCNC: 311 MG/DL (ref 74–109)
POTASSIUM SERPL-SCNC: 4.6 MMOL/L (ref 3.5–5)
SODIUM BLD-SCNC: 137 MMOL/L (ref 136–145)
TROPONIN: 0.05 NG/ML (ref 0–0.03)

## 2018-01-14 PROCEDURE — 2580000003 HC RX 258: Performed by: INTERNAL MEDICINE

## 2018-01-14 PROCEDURE — 99213 OFFICE O/P EST LOW 20 MIN: CPT | Performed by: PSYCHIATRY & NEUROLOGY

## 2018-01-14 PROCEDURE — 36415 COLL VENOUS BLD VENIPUNCTURE: CPT

## 2018-01-14 PROCEDURE — 96372 THER/PROPH/DIAG INJ SC/IM: CPT

## 2018-01-14 PROCEDURE — 99225 PR SBSQ OBSERVATION CARE/DAY 25 MINUTES: CPT | Performed by: INTERNAL MEDICINE

## 2018-01-14 PROCEDURE — G0378 HOSPITAL OBSERVATION PER HR: HCPCS

## 2018-01-14 PROCEDURE — 93005 ELECTROCARDIOGRAM TRACING: CPT

## 2018-01-14 PROCEDURE — 6360000002 HC RX W HCPCS: Performed by: INTERNAL MEDICINE

## 2018-01-14 PROCEDURE — 6370000000 HC RX 637 (ALT 250 FOR IP): Performed by: INTERNAL MEDICINE

## 2018-01-14 PROCEDURE — 84484 ASSAY OF TROPONIN QUANT: CPT

## 2018-01-14 PROCEDURE — 80048 BASIC METABOLIC PNL TOTAL CA: CPT

## 2018-01-14 PROCEDURE — 6370000000 HC RX 637 (ALT 250 FOR IP): Performed by: PSYCHIATRY & NEUROLOGY

## 2018-01-14 RX ADMIN — GEMFIBROZIL 600 MG: 600 TABLET ORAL at 08:39

## 2018-01-14 RX ADMIN — Medication 81 MG: at 08:38

## 2018-01-14 RX ADMIN — SIMVASTATIN 40 MG: 40 TABLET, FILM COATED ORAL at 20:02

## 2018-01-14 RX ADMIN — FERROUS SULFATE TAB 325 MG (65 MG ELEMENTAL FE) 325 MG: 325 (65 FE) TAB at 08:38

## 2018-01-14 RX ADMIN — METOCLOPRAMIDE HYDROCHLORIDE 10 MG: 10 TABLET ORAL at 16:35

## 2018-01-14 RX ADMIN — GABAPENTIN 600 MG: 600 TABLET, FILM COATED ORAL at 08:38

## 2018-01-14 RX ADMIN — Medication 10 ML: at 20:02

## 2018-01-14 RX ADMIN — GABAPENTIN 600 MG: 600 TABLET, FILM COATED ORAL at 20:02

## 2018-01-14 RX ADMIN — TAMSULOSIN HYDROCHLORIDE 0.4 MG: 0.4 CAPSULE ORAL at 08:40

## 2018-01-14 RX ADMIN — GEMFIBROZIL 600 MG: 600 TABLET ORAL at 20:02

## 2018-01-14 RX ADMIN — Medication 10 ML: at 08:41

## 2018-01-14 RX ADMIN — LISINOPRIL 40 MG: 20 TABLET ORAL at 08:39

## 2018-01-14 RX ADMIN — GABAPENTIN 600 MG: 600 TABLET, FILM COATED ORAL at 13:55

## 2018-01-14 RX ADMIN — METOPROLOL SUCCINATE 50 MG: 50 TABLET, EXTENDED RELEASE ORAL at 08:40

## 2018-01-14 RX ADMIN — ENOXAPARIN SODIUM 40 MG: 40 INJECTION SUBCUTANEOUS at 08:38

## 2018-01-14 RX ADMIN — METOCLOPRAMIDE HYDROCHLORIDE 10 MG: 10 TABLET ORAL at 06:14

## 2018-01-14 RX ADMIN — SERTRALINE HYDROCHLORIDE 150 MG: 100 TABLET ORAL at 08:40

## 2018-01-14 RX ADMIN — METOCLOPRAMIDE HYDROCHLORIDE 10 MG: 10 TABLET ORAL at 11:39

## 2018-01-14 RX ADMIN — METOPROLOL SUCCINATE 50 MG: 50 TABLET, EXTENDED RELEASE ORAL at 20:02

## 2018-01-14 NOTE — PROGRESS NOTES
Tanmay Mcmahan MD   40 mg at 01/14/18 0838    simvastatin (ZOCOR) tablet 40 mg  40 mg Oral Nightly LINDSAY Whitney MD   40 mg at 01/13/18 2027    sertraline (ZOLOFT) tablet 150 mg  150 mg Oral Daily LINDSAY Whitney MD   150 mg at 01/14/18 0840    aspirin EC tablet 81 mg  81 mg Oral Daily LINDSAY Whitney MD   81 mg at 01/14/18 0838    lisinopril (PRINIVIL;ZESTRIL) tablet 40 mg  40 mg Oral Daily LINDSAY Whitney MD   40 mg at 01/14/18 0839    gabapentin (NEURONTIN) tablet 600 mg  600 mg Oral TID LINDSAY Whitney MD   600 mg at 01/14/18 0838    gemfibrozil (LOPID) tablet 600 mg  600 mg Oral BID LINDSAY Whitney MD   600 mg at 01/14/18 0839    metoprolol succinate (TOPROL XL) extended release tablet 50 mg  50 mg Oral BID LINDSAY Whitney MD   50 mg at 01/14/18 0840    ferrous sulfate tablet 325 mg  325 mg Oral Daily with breakfast LINDSAY Whitney MD   325 mg at 01/14/18 0838    [START ON 1/18/2018] Dulaglutide SOPN 1.5 mg  1.5 mg Subcutaneous Weekly LINDSAY Whitney MD        metoclopramide [facility]) tablet 10 mg  10 mg Oral TID AC LINDSAY Whitney MD   10 mg at 01/14/18 7716    [START ON 1/17/2018] vitamin D (ERGOCALCIFEROL) capsule 50,000 Units  50,000 Units Oral Weekly LINDSAY Whitney MD           Past Medical History:  Past Medical History:   Diagnosis Date    Benign prostatic hypertrophy     Blind left eye     CAD (coronary artery disease)     Cerebrovascular disease     history TIA    Diabetic retinopathy of right eye ([facility] [address]     Hyperlipidemia     Hypertension     Refusal of blood transfusions as patient is [employer]     Type II or unspecified type diabetes mellitus without mention of complication, not stated as uncontrolled     on insulin       Past Surgical History:  Past Surgical History:   Procedure Laterality Date    APPENDECTOMY      CHOLECYSTECTOMY      COLON SURGERY      partial colon resection    CORONARY ARTERY BYPASS GRAFT 4/21/2011    3V OPCAB: LIMA-LAD, SVG-OM, distal RCA  ([facility])    EYE SURGERY      bilateral cataract surgery       Family History  Family History   Problem Relation Age of Onset    Cancer Other     Diabetes Other     High Blood Pressure Other     Heart Disease Other        Social History  Social History     Social History    Marital status:      Spouse name: N/A    Number of children: N/A    Years of education: N/A     Occupational History    Not on file. Social History Main Topics    Smoking status: Never Smoker    Smokeless tobacco: Never Used    Alcohol use No    Drug use: No    Sexual activity: Not Currently     Partners: Female     Other Topics Concern    Not on file     Social History Narrative    No narrative on file         Review of Systems:  History obtained from chart review and the patient  General ROS: No fever or chills  Respiratory ROS: No cough, shortness of breath, wheezing  Cardiovascular ROS: no chest pain or dyspnea on exertion  Gastrointestinal ROS: No abdominal pain or melena  Genito-Urinary ROS: + dysuria, no hematuria  Musculoskeletal ROS: No joint pain or swelling         Objective:  Blood pressure (!) 105/49, pulse 61, temperature 98.4 °F (36.9 °C), temperature source Temporal, resp. rate 16, height 5' 8\" (1.727 m), weight 158 lb 12.8 oz (72 kg), SpO2 95 %.     Intake/Output Summary (Last 24 hours) at 01/14/18 0852  Last data filed at 01/14/18 0433   Gross per 24 hour   Intake             1190 ml   Output              200 ml   Net              990 ml     General: alert and oriented x3   Chest:  clear to auscultation bilaterally without respiratory distress  CVS: regular rate and rhythm  Abdominal: soft, nontender, normal bowel sounds  Extremities: no cyanosis or edema  Skin: warm and dry without rash    Labs:  BMP:   Recent Labs      01/12/18   0817  01/13/18   0208  01/14/18   0229   NA  137  135*  137   K  5.3*  4.7  4.6   CL  99  99  100   CO2  22  22  23   BUN 53*  49*  38*   CREATININE  1.6*  1.4*  1.3*   CALCIUM  9.6  8.7*  8.6*     CBC:   Recent Labs      01/12/18   0225   WBC  7.6   HGB  11.2*   HCT  35.5*   MCV  89.2   PLT  231     LIVER PROFILE:   Recent Labs      01/12/18   0817   AST  16   ALT  9   BILITOT  0.5   ALKPHOS  83     PT/INR:   Recent Labs      01/12/18   0225   PROTIME  15.0*   INR  1.18     APTT: No results for input(s): APTT in the last 72 hours. BNP:  No results for input(s): BNP in the last 72 hours. Ionized Calcium:No results for input(s): IONCA in the last 72 hours. Magnesium:No results for input(s): MG in the last 72 hours. Phosphorus:No results for input(s): PHOS in the last 72 hours. HgbA1C:   Recent Labs      01/12/18   1032   LABA1C  9.9*     Hepatic:   Recent Labs      01/12/18   0817   ALKPHOS  83   ALT  9   AST  16   PROT  7.5   BILITOT  0.5   LABALBU  4.3     Lactic Acid: No results for input(s): LACTA in the last 72 hours. Troponin:   Recent Labs      01/12/18   1032   CKTOTAL  158     ABGs: No results for input(s): PH, PCO2, PO2, HCO3, O2SAT in the last 72 hours. CRP:  No results for input(s): CRP in the last 72 hours. Sed Rate:    Recent Labs      01/12/18   1032   SEDRATE  86*       Cultures:   No results for input(s): CULTURE in the last 72 hours. Radiology reports as per the Radiologist  Radiology: No results found. Assessment     -SARA ( prerenal azotemia)  -CKD stage 3  -Type 2 DM (uncontrolled)  -Peripheral neuropathy  -Dysuria (?BPH)  -Hyperkalemia-resolved    Plan:  Continue lisinopril for renal protection. Aroldo Perdue with discharge. Follow up at the renal clinic within 2 weeks.

## 2018-01-14 NOTE — PROGRESS NOTES
Patient requested assistance with shower and was helped to the bathroom with assistance of a walker. Patient stated he had a shower yesterday and no problems so he wanted to wash up this morning. Patient walked to the bathroom with assistance of walker and myself without problems or difficulty. Patient was assisted to the shower chair and told that he was not to get up without assistance when he was finished washing up. Daughter was in the room at the time of the bath and I explained that the patient could not get out of the shower or shower chair by himself. Daughter and family left in the room with the patient during his shower.    Electronically signed by Paresh Connor RN on 1/14/2018 at 10:01 AM

## 2018-01-14 NOTE — CARE COORDINATION
Patient's family member came to the desk and stated the patient had \"slid off the chair in the shower. \"  Myself along with other nurses went to the room. The patient was on the floor of the shower with the chair behind him. He was sitting in an upright position sitting with back again the chair. He was alert and oriented. He voiced no complaint of pain. Water was turned off and a gait belt was applied around the patient's waist.  Towels were put in the bottom of the shower to prevent further sliding. Two nurses assisted the patient to his feet. A recliner was taken to the door of the bathroom to assist moving the patient across the room. On assessment, the patient's only obvious injury was bleeding on the top of the fourth toe on the right foot. The toe was cleansed with a saline flush to investigate depth and was noted to be only skin depth. No further bleeding. A band-aid was applied. Clinical House and physician were notified.

## 2018-01-14 NOTE — PROGRESS NOTES
developed and well nourished    HEENT   PERRLA, Hearing appears normal, conjunctiva and lids are normal, ears and nose appear normal  NECK - no thyromegaly, no JVD, trachea is in the midline  CARDIOVASCULAR  PMI is in the left mid line clavicular position, Normal S1 and S2 with a grade 1/6 systolic murmur. No S3 or S4    PULMONARY  No respiratory distress. scattered wheezes and rales. Breath sounds in both  lung fields are Decreased  ABDOMEN   soft, non tender, no rebound, no hepatomegaly or splenomegaly  MUSCULOSKELETAL   Prone/Supine, digitals and nails are without clubbing or cyanosis  EXTREMITIES - trace+ edema  NEUROLOGIC - cranial nerves, II-XII, are normal  SKIN - turgor is normal, no rash  PSYCHIATRIC - normal mood and affect, alert and orientated x 3, judgement and insight appear appropriate      LABORATORY EVALUATION & TESTING:    I have personally reviewed and interpreted the results of the following diagnostic endeavors     CBC:   Recent Labs      01/12/18   0225   WBC  7.6   HGB  11.2*   HCT  35.5*   PLT  231     BMP: Recent Labs      01/13/18   0208  01/14/18   0229   NA  135*  137   K  4.7  4.6   CO2  22  23   BUN  49*  38*   CREATININE  1.4*  1.3*   LABGLOM  49*  54*   GLUCOSE  293*  311*     BNP: No results for input(s): BNP in the last 72 hours. PT/INR:   Recent Labs      01/12/18 0225   PROTIME  15.0*   INR  1.18     APTT:No results for input(s): APTT in the last 72 hours.   CARDIAC ENZYMES:  Recent Labs      01/12/18   0817  01/12/18   1032  01/13/18   1202  01/13/18   2336   CKTOTAL   --   158   --    --    TROPONINI  0.08*   --   0.06*  0.05*     FASTING LIPID PANEL:  Lab Results   Component Value Date    HDL 34 01/12/2018    LDLDIRECT 123 01/12/2018    LDLCALC see below 01/12/2018    TRIG 404 01/12/2018     LIVER PROFILE:  Recent Labs      01/12/18 0817   AST  16   ALT  9   LABALBU  4.3           Current Inpatient Medications:     tamsulosin  0.4 mg Oral Daily    sodium chloride flush  10 mL Intravenous 2 times per day    enoxaparin  40 mg Subcutaneous Daily    simvastatin  40 mg Oral Nightly    sertraline  150 mg Oral Daily    aspirin EC  81 mg Oral Daily    lisinopril  40 mg Oral Daily    gabapentin  600 mg Oral TID    gemfibrozil  600 mg Oral BID    metoprolol succinate  50 mg Oral BID    ferrous sulfate  325 mg Oral Daily with breakfast    [START ON 1/18/2018] Dulaglutide  1.5 mg Subcutaneous Weekly    metoclopramide  10 mg Oral TID AC    [START ON 1/17/2018] vitamin D  50,000 Units Oral Weekly       IV Infusions (if any):         Diagnostics:      EKG and or Telemetry:  which was personally reviewed me:  Sinus rhythm,   Pulse Readings from Last 3 Encounters:   01/14/18 65   07/19/17 72   02/08/17 64    bpm      ASSESSMENT:    CARDIOLOGY PROBLEMS ARE LISTED ABOVE; HOWEVER, THE FOLLOWING SPECIFIC CARDIAC PROBLEMS EITHER LISTED ABOVE OR NOT,  WERE ADDRESSED AND TREATED DURING THE HOSPITAL VISIT TODAY:                                                                                                 MEDICAL DECISION MAKING             Cardiac Specific Problem / Diagnosis  Discussion and Data Reviewed Diagnostic Procedures Ordered Management Options Selected           1. Elevated troponin in the face of elevated creatinine  show no change   Review and summation of old records: The creatinine is falling    It is always difficult how to decipher elevated troponin levels in the patient without a myocardial infarction. However, these causes can roughly be considered into four categories:    1. Demand ischemia:  An imbalance between increased oxygen demand and supply (type 2 MI),   2.  Myocardial damage related to non-ischemic causes (eg, myocarditis or direct trauma),  3.  Renal failure (The cTnT elevations seen in patients with renal failure may be due to structural myocardial abnormalities and are invariably associated with pathological evidence of myocardial injury.  Data also

## 2018-01-14 NOTE — PROGRESS NOTES
Patient:   Shruthi Velasquez  MR#:    [id number]   Room:    0715/715-02   YOB: 1940  Date of Progress Note: 1/14/2018  Time of Note                           10:11 AM  Consulting Physician:   Joby Heller M.D. Attending Physician:  LINDSAY Casillas MD     CHIEF COMPLAINT: Weakness and unsteadiness on his feet  ? Subjective-  This 78 y.o. male who presents with elevated troponin from outside hospital. He is admitted to cardiology. I'm asked to see him for complaints of slow progressive weakness. He says he has had these problems for \"years\". He has severe wasting of his hands and weakness in his distal lower extremities. He has been using a walker for quite some time. He also has numbness and tingling in his toes and feet and says he can't feel anything down there. He has chronically poorly controlled diabetes apparently. He does have a history of  coronary artery disease. Has recently been found to have gastroparesis and a bezoar and was placed on Reglan about a month ago. Bladder difficulties have resolved with Flomax. He chronically has some problems with diarrhea and he says. Unclear if he has any trouble with sweating or producing tears. Denies any significant pain in his arms or legs per se. No double vision or clear trouble swallowing although he was having some choking a few months ago before he got put on Reglan. He takes Zocor and gemfibrozil chronically. Denies a family history of neuropathy. No new neurological difficulties overnight.  Bowel and bladder are functioning  REVIEW OF SYSTEMS:  Constitutional: No fevers No chills  Neck:No stiffness  Respiratory: No shortness of breath  Cardiovascular: No chest pain No palpitations  Gastrointestinal: No abdominal pain    Genitourinary: No Dysuria  Neurological: No headache, no confusion      PHYSICAL EXAM:  BP (!) 105/49   Pulse 61   Temp 98.4 °F (36.9 °C) (Temporal)   Resp 16   Ht 5' 8\" (1.727 m)   Wt 158 lb 12.8 oz (72 kg)   SpO2 95% BMI 24.15 kg/m²     Constitutional: he appears well-developed and well-nourished. Eyes  conjunctiva normal.  Pupils react to light  Ear, nose, throat -hearing intact to finger rub No scars, masses, or lesions over external nose or ears, no atrophy of tongue  Neck-symmetric, no masses noted, no jugular vein distension  Respiration- chest wall appears symmetric, good expansion,   normal effort without use of accessory muscles  Musculoskeletal  no significant wasting of muscles noted, no bony deformities, gait no gross ataxia  Extremities-no clubbing, cyanosis or edema  Skin  warm, dry, and intact. No rash, erythema, or pallor. Psychiatric  mood, affect, and behavior appear normal.      Neurology  NEUROLOGICAL EXAM:      Mental status   Awake, alert, fluent oriented x 3 appropriate affect  Attention and concentration appear appropriate  Recent and remote memory appears unremarkable  Speech normal without dysarthria  No clear issues with language       Cranial Nerves   CN II- Visual fields grossly unremarkable  CN III, IV,VI-EOMI, No nystagmus, conjugate eye movements, no ptosis  CN VII-no facial assymetry  CN VIII-Hearing intact   CN IX and X- Palate elevates in midline  CN XI-good shoulder shrug  CN XII-Tongue midline with no fasciculations or fibrillations          Motor function  Normal proximally. Severe atrophy in bilateral hands with wasting of the forearms and distal leg. 4/5 bilateral dorsiflexion and plantar flexion. Sensory function Decreased vibration to the knees. Decreased pinprick to the mid thighs bilaterally     Cerebellar F-N intact     Tremor None present     Gait                  Not tested           Nursing/pcp notes, imaging,labs and vitals reviewed.      PT,OT and/or speech notes reviewed    Lab Results   Component Value Date    WBC 7.6 01/12/2018    HGB 11.2 (L) 01/12/2018    HCT 35.5 (L) 01/12/2018    MCV 89.2 01/12/2018     01/12/2018     Lab Results   Component Value Date  01/14/2018    K 4.6 01/14/2018     01/14/2018    CO2 23 01/14/2018    BUN 38 (H) 01/14/2018    CREATININE 1.3 (H) 01/14/2018    GLUCOSE 311 (H) 01/14/2018    CALCIUM 8.6 (L) 01/14/2018    PROT 7.5 01/12/2018    LABALBU 4.3 01/12/2018    BILITOT 0.5 01/12/2018    ALKPHOS 83 01/12/2018    AST 16 01/12/2018    ALT 9 01/12/2018    LABGLOM 54 (A) 01/14/2018     Lab Results   Component Value Date    INR 1.18 01/12/2018    INR 1.25 (H) 04/21/2011    INR 1.05 04/20/2011     Lab Results   Component Value Date    CRP 0.17 04/20/2011             RECORD REVIEW: Previous medical records, medications were reviewed at today's visit    IMPRESSION:   Neuropathy. 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 This could be due to chronically poorly controlled diabetes however it is somewhat worse than is typically seen. His A1c is 9.9. B12 and TSH are normal. SPEP is pending. He may have an underlying additional reason for neuropathy and I have also considered Charcot-Lindsey-Tooth or other similar type hereditary conditions. He has no evidence of a myopathy or any spinal cord dysfunction on examination. His family believes he had a CT or MRI of the spine at [facility] before he was transferred here. I have requested those films. CPK is normal. We would not be able to discern any hyperreflexia however given his severe neuropathy even if he were to have it. No clear cortical dysfunction that would appear new. Continue with physical and occupational therapy. Will get outpatient nerve conduction studies of the upper extremities and await the remainder of his tests. Okay to go home from a neurological standpoint. I will follow up the patient in the office.

## 2018-01-14 NOTE — PROGRESS NOTES
[facility] has been consulted to review medication profile for fall risk. Medications increasing risk of falling:                                      Dizziness       Psychomotor impairment   Syncope  Vertigo  Lortab                               X                                    X                          X  Aspirin                              X  Gabapentin                      X                                                                                 X  Gemfibrozil                      X                                                                  X  Lisinopril                          X                                                                  X             X  Metoclopramide              X  Metoprolol                       X                                                                  X              X  Ondansetron                   X                                                                  X  Sertraline                        X                                                                   X  Simvastatin                     X                                                                                   X  Tamsulosin                     X                                                                   X              X          Medications increasing risk of bleeding: Lortab, Aspirin, Enoxaparin, or Sertraline. Findings discussed with Roel Nyhan, RN. Recommendations: Staff member/ Family to assist with showering/bath. Recommend placement of towel on shower seat to make it less slippery as smooth top of shower seat appears to be the problem in this fall.     Electronically signed by Da Heck, [facility] on 1/14/2018 at 1:04 PM

## 2018-01-15 VITALS
TEMPERATURE: 97.1 F | RESPIRATION RATE: 16 BRPM | SYSTOLIC BLOOD PRESSURE: 179 MMHG | WEIGHT: 160.4 LBS | OXYGEN SATURATION: 95 % | HEIGHT: 68 IN | DIASTOLIC BLOOD PRESSURE: 77 MMHG | HEART RATE: 62 BPM | BODY MASS INDEX: 24.31 KG/M2

## 2018-01-15 LAB
ALBUMIN SERPL-MCNC: 5.1 G/DL (ref 3.75–5.01)
ALPHA-1-GLOBULIN: 0.52 G/DL (ref 0.19–0.46)
ALPHA-2-GLOBULIN: 1.18 G/DL (ref 0.48–1.05)
ANION GAP SERPL CALCULATED.3IONS-SCNC: 15 MMOL/L (ref 7–19)
BETA GLOBULIN: 1.16 G/DL (ref 0.48–1.1)
BUN BLDV-MCNC: 29 MG/DL (ref 8–23)
CALCIUM SERPL-MCNC: 8.8 MG/DL (ref 8.8–10.2)
CHLORIDE BLD-SCNC: 99 MMOL/L (ref 98–111)
CO2: 23 MMOL/L (ref 22–29)
CREAT SERPL-MCNC: 1.2 MG/DL (ref 0.5–1.2)
EKG P AXIS: 51 DEGREES
EKG P-R INTERVAL: 176 MS
EKG Q-T INTERVAL: 460 MS
EKG QRS DURATION: 110 MS
EKG QTC CALCULATION (BAZETT): 467 MS
EKG T AXIS: 135 DEGREES
GAMMA GLOBULIN: 0.74 G/DL (ref 0.62–1.51)
GFR NON-AFRICAN AMERICAN: 59
GLUCOSE BLD-MCNC: 322 MG/DL (ref 74–109)
POTASSIUM SERPL-SCNC: 4.8 MMOL/L (ref 3.5–5)
PROTEIN ELECTROPHORESIS, SERUM: ABNORMAL
SODIUM BLD-SCNC: 137 MMOL/L (ref 136–145)
SPE/IFE INTERPRETATION: ABNORMAL
TOTAL PROTEIN: 8.7 G/DL (ref 6–8.3)

## 2018-01-15 PROCEDURE — 97116 GAIT TRAINING THERAPY: CPT

## 2018-01-15 PROCEDURE — 99225 PR SBSQ OBSERVATION CARE/DAY 25 MINUTES: CPT | Performed by: INTERNAL MEDICINE

## 2018-01-15 PROCEDURE — 80048 BASIC METABOLIC PNL TOTAL CA: CPT

## 2018-01-15 PROCEDURE — G0378 HOSPITAL OBSERVATION PER HR: HCPCS

## 2018-01-15 PROCEDURE — 6370000000 HC RX 637 (ALT 250 FOR IP): Performed by: PSYCHIATRY & NEUROLOGY

## 2018-01-15 PROCEDURE — 6370000000 HC RX 637 (ALT 250 FOR IP): Performed by: INTERNAL MEDICINE

## 2018-01-15 PROCEDURE — 96372 THER/PROPH/DIAG INJ SC/IM: CPT

## 2018-01-15 PROCEDURE — G8987 SELF CARE CURRENT STATUS: HCPCS

## 2018-01-15 PROCEDURE — G8988 SELF CARE GOAL STATUS: HCPCS

## 2018-01-15 PROCEDURE — 93005 ELECTROCARDIOGRAM TRACING: CPT

## 2018-01-15 PROCEDURE — 2580000003 HC RX 258: Performed by: INTERNAL MEDICINE

## 2018-01-15 PROCEDURE — 97165 OT EVAL LOW COMPLEX 30 MIN: CPT

## 2018-01-15 PROCEDURE — 6360000002 HC RX W HCPCS: Performed by: INTERNAL MEDICINE

## 2018-01-15 PROCEDURE — 36415 COLL VENOUS BLD VENIPUNCTURE: CPT

## 2018-01-15 PROCEDURE — 97535 SELF CARE MNGMENT TRAINING: CPT

## 2018-01-15 RX ORDER — TAMSULOSIN HYDROCHLORIDE 0.4 MG/1
0.4 CAPSULE ORAL DAILY
Qty: 30 CAPSULE | Refills: 0 | Status: SHIPPED | OUTPATIENT
Start: 2018-01-16

## 2018-01-15 RX ADMIN — Medication 81 MG: at 08:06

## 2018-01-15 RX ADMIN — GABAPENTIN 600 MG: 600 TABLET, FILM COATED ORAL at 08:06

## 2018-01-15 RX ADMIN — METOCLOPRAMIDE HYDROCHLORIDE 10 MG: 10 TABLET ORAL at 08:06

## 2018-01-15 RX ADMIN — LISINOPRIL 40 MG: 20 TABLET ORAL at 08:06

## 2018-01-15 RX ADMIN — GABAPENTIN 600 MG: 600 TABLET, FILM COATED ORAL at 13:01

## 2018-01-15 RX ADMIN — ENOXAPARIN SODIUM 40 MG: 40 INJECTION SUBCUTANEOUS at 08:07

## 2018-01-15 RX ADMIN — TAMSULOSIN HYDROCHLORIDE 0.4 MG: 0.4 CAPSULE ORAL at 08:06

## 2018-01-15 RX ADMIN — FERROUS SULFATE TAB 325 MG (65 MG ELEMENTAL FE) 325 MG: 325 (65 FE) TAB at 08:06

## 2018-01-15 RX ADMIN — Medication 10 ML: at 08:09

## 2018-01-15 RX ADMIN — SERTRALINE HYDROCHLORIDE 150 MG: 100 TABLET ORAL at 08:05

## 2018-01-15 RX ADMIN — METOCLOPRAMIDE HYDROCHLORIDE 10 MG: 10 TABLET ORAL at 13:01

## 2018-01-15 RX ADMIN — GEMFIBROZIL 600 MG: 600 TABLET ORAL at 08:06

## 2018-01-15 RX ADMIN — METOPROLOL SUCCINATE 50 MG: 50 TABLET, EXTENDED RELEASE ORAL at 08:06

## 2018-01-15 NOTE — PROGRESS NOTES
Occupational Therapy   Occupational Therapy Initial Assessment  Date: 1/15/2018   Patient Name: Gamaliel Woodward  MRN: [id number]     : 1940    Patient Diagnosis(es): The encounter diagnosis was Renal failure, unspecified chronicity. has a past medical history of Benign prostatic hypertrophy; Blind left eye; CAD (coronary artery disease); Cerebrovascular disease; Diabetic retinopathy of right eye ([address] Hyperlipidemia; Hypertension; Refusal of blood transfusions as patient is [employer]; and Type II or unspecified type diabetes mellitus without mention of complication, not stated as uncontrolled. has a past surgical history that includes Coronary artery bypass graft (2011); Colon surgery; Appendectomy; Cholecystectomy; and eye surgery. Treatment Diagnosis: Chest pain      Restrictions  Restrictions/Precautions  Restrictions/Precautions: Fall Risk    Subjective   General  Chart Reviewed: Yes  Patient assessed for rehabilitation services?: Yes  Additional Pertinent Hx: Per daughter, pt. is Observation. Pt lives alone but daughter is next door. Family / Caregiver Present: Yes  Diagnosis: Chest Pain  Pain Assessment  Patient Currently in Pain: No  Pain Assessment: 0-10  Pain Level: 0     Social/Functional History  Social/Functional History  Lives With: Alone  Type of Home: House  Home Layout: One level (Has 1 step to different part of the house.)  Bathroom Shower/Tub: Tub/Shower unit  Bathroom Equipment: Tub transfer bench  ADL Assistance: Needs assistance  Ambulation Assistance: Independent  Transfer Assistance: Independent  Active : No  Occupation: Retired  Additional Comments: Pt. has B finger deformities and neuropathy. Has assist with buttons and fasteners. Wears velcro fasten shoes.        Objective        Orientation  Overall Orientation Status: Within Normal Limits     Standing Balance  Sit to stand: Minimal assistance  ADL  Feeding: Independent  Grooming: Independent  UE Bathing: Supervision  LE Bathing: Moderate assistance  UE Dressing: Supervision  LE Dressing: Moderate assistance  Toileting: Moderate assistance           Transfers  Stand Step Transfers: Minimal assistance  Sit to stand: Minimal assistance     Cognition  Overall Cognitive Status: WNL        Sensation  Overall Sensation Status: Impaired        LUE AROM (degrees)  LUE AROM : WFL  RUE AROM (degrees)  RUE AROM : WFL  LUE Strength  Gross LUE Strength: Exceptions to [facility]  L Shoulder Flex: 4-/5  L Shoulder ABduction: 4-/5  L Elbow Flex: 4-/5  L Elbow Ext: 4-/5  RUE Strength  Gross RUE Strength: Exceptions to [facility]  R Shoulder Flex: 4-/5  R Shoulder ABduction: 4-/5  R Elbow Flex: 4-/5  R Elbow Ext: 4-/5                  Assessment   Performance deficits / Impairments: Decreased ADL status; Decreased strength;Decreased sensation;Decreased balance;Decreased functional mobility ; Decreased endurance  Assessment: Pt. really would benefit from therapy in a SNF setting before returning home alone but is Observation. Told pt. and daughter pt. will be at risk for falls if going home alone. Will progress as tolerated. Treatment Diagnosis: Chest pain  Prognosis: Good  Decision Making: Low Complexity  REQUIRES OT FOLLOW UP: Yes  Activity Tolerance  Activity Tolerance: Patient limited by fatigue        Discharge Recommendations:        Plan   Plan  Times per week: 3-5x/week  Times per day: Daily    G-Code  OT G-codes  Functional Assessment Tool Used: ADLs, transfers  Score: CJ  Functional Limitation: Self care  Self Care Current Status (): At least 20 percent but less than 40 percent impaired, limited or restricted  Self Care Goal Status ():  At least 1 percent but less than 20 percent impaired, limited or restricted  OutComes Score                                           AM-PAC Score             Goals  Short term goals  Time Frame for Short term goals: 1 week  Short term goal 1: Supervision with toilet tfers  Short term goal 2:

## 2018-01-15 NOTE — PROGRESS NOTES
Occupational Therapy  Facility/Department: [facility] 7 PROGRESSIVE CARE  Daily Treatment Note  NAME: Phyllis Blue  : 1940  MRN: [id number]    Date of Service: 1/15/2018    Patient Diagnosis(es): The encounter diagnosis was Renal failure, unspecified chronicity. has a past medical history of Benign prostatic hypertrophy; Blind left eye; CAD (coronary artery disease); Cerebrovascular disease; Diabetic retinopathy of right eye ([address] Hyperlipidemia; Hypertension; Refusal of blood transfusions as patient is Catholic; and Type II or unspecified type diabetes mellitus without mention of complication, not stated as uncontrolled. has a past surgical history that includes Coronary artery bypass graft (2011); Colon surgery; Appendectomy; Cholecystectomy; and eye surgery. Restrictions  Restrictions/Precautions  Restrictions/Precautions: Fall Risk  Subjective   General  Chart Reviewed: Yes  Patient assessed for rehabilitation services?: Yes  Additional Pertinent Hx: Per daughter, pt. is Observation. Pt lives alone but daughter is next door. Family / Caregiver Present: Yes  Diagnosis: Chest Pain  General Comment  Comments: Pt. needing to walk to bathroom. Has had diarrhea. Pain Assessment  Patient Currently in Pain: No  Vital Signs  Patient Currently in Pain: No   Orientation  Orientation  Overall Orientation Status: Within Normal Limits  Objective    ADL  Feeding: Independent  Grooming: Independent  UE Bathing: Supervision  LE Bathing: Moderate assistance  UE Dressing: Supervision  LE Dressing: Moderate assistance  Toileting: Moderate assistance  Additional Comments: Needed Mod A for toilet hygiene in standing, Mod A with clothing mgmt  of briefs in standing.         Standing Balance  Sit to stand: Minimal assistance     Transfers  Stand Step Transfers: Minimal assistance  Sit to stand: Minimal assistance                       Cognition  Overall Cognitive Status: WNL                       LUE AROM

## 2018-01-15 NOTE — PROGRESS NOTES
[facility]  Progress Note                            Date:  1/15/2018  Patient: Sumeet Aguilera  Admission:  1/11/2018  4:11 PM  Admit DX: Chest pain [R07.9]  Age:  78 y. o., 1940     LOS: 0 days     Reason for evaluation:   ischemic heart disease, coronary artery disease and CABG      SUBJECTIVE:    The patient was seen and examined. Notes and labs reviewed. There were no complications over night. Patient's cardiac review of systems: negative. The patient is generally feeling stable. Is asking to be discharged. OBJECTIVE:    Telemetry: Sinus rhythm  BP (!) 179/77   Pulse 62   Temp 97.1 °F (36.2 °C) (Temporal)   Resp 16   Ht 5' 8\" (1.727 m)   Wt 160 lb 6.4 oz (72.8 kg)   SpO2 95%   BMI 24.39 kg/m²     Intake/Output Summary (Last 24 hours) at 01/15/18 1222  Last data filed at 01/15/18 0850   Gross per 24 hour   Intake             1080 ml   Output                0 ml   Net             1080 ml       Labs:   CBC: No results for input(s): WBC, HGB, HCT, PLT in the last 72 hours. BMP: Recent Labs      01/14/18   0229  01/15/18   0222   NA  137  137   K  4.6  4.8   CO2  23  23   BUN  38*  29*   CREATININE  1.3*  1.2   LABGLOM  54*  59*   GLUCOSE  311*  322*     BNP: No results for input(s): BNP in the last 72 hours. PT/INR: No results for input(s): PROTIME, INR in the last 72 hours. APTT:No results for input(s): APTT in the last 72 hours. CARDIAC ENZYMES:  Recent Labs      01/13/18   1202  01/13/18   2336   TROPONINI  0.06*  0.05*     FASTING LIPID PANEL:  Lab Results   Component Value Date    HDL 34 01/12/2018    LDLDIRECT 123 01/12/2018    LDLCALC see below 01/12/2018    TRIG 404 01/12/2018     LIVER PROFILE:No results for input(s): AST, ALT, LABALBU in the last 72 hours.         PFSH:  No change    ROSS:  No change      Physical Examination:  BP (!) 179/77   Pulse 62   Temp 97.1 °F (36.2 °C) (Temporal)   Resp 16   Ht 5' 8\" (1.727 m)   Wt 160 lb 6.4 oz (72.8 kg) SpO2 95%   BMI 24.39 kg/m²     CONSTITUTIONAL:  Awake, alert, cooperative, no apparent distress, and appears stated age. HEENT: Normal jugular venous pulsations, no carotid bruits. Head is atraumatic, normocephalic. Eyes and oral mucosa are normal.  LUNGS: Respiratory effort for the work of breathing is normal.  On auscultation: Lungs clear  CARDIOVASCULAR:  Normal apical impulse, regular rate and rhythm, normal S1 and S2, no S3 or S4, and no murmur or rub is noted. ABDOMEN: Soft, nontender, nondistended. Bowel sounds are present. No masses or tenderness. SKIN: Warm and dry. EXTREMITIES: No lower extremity edema. No cyanosis or clubbing. NEUROLOGY:  Motor movement grossly intact. Focal signs are not identified. Current Inpatient Medications:   tamsulosin  0.4 mg Oral Daily    sodium chloride flush  10 mL Intravenous 2 times per day    enoxaparin  40 mg Subcutaneous Daily    simvastatin  40 mg Oral Nightly    sertraline  150 mg Oral Daily    aspirin EC  81 mg Oral Daily    lisinopril  40 mg Oral Daily    gabapentin  600 mg Oral TID    gemfibrozil  600 mg Oral BID    metoprolol succinate  50 mg Oral BID    ferrous sulfate  325 mg Oral Daily with breakfast    [START ON 1/18/2018] Dulaglutide  1.5 mg Subcutaneous Weekly    metoclopramide  10 mg Oral TID AC    [START ON 1/17/2018] vitamin D  50,000 Units Oral Weekly       IV Infusions (if any):       Diagnostics:    EKG: normal sinus rhythm, unchanged from previous tracings. ECHO: Reviewed. Stress Test: Not obtained. Cardiac Angiography: Not obtained.     ASSESSMENT:    Patient Active Problem List    Diagnosis Date Noted    Renal failure      Priority: High    Elevated troponin level      Priority: High    Coronary artery disease involving native coronary artery of native heart      Priority: High    Acute renal failure superimposed on stage 4 chronic kidney disease ([facility] [address]      Priority: High    Chest pain 01/12/2018     Priority: Low  CAD (coronary artery disease)      Priority: Low    Hypertension      Priority: Low    Hyperlipidemia      Priority: Low    Type II or unspecified type diabetes mellitus without mention of complication, not stated as uncontrolled      Priority: Low     on insulin       78year-old with elevated troponin and acute renal insufficiency now improving. No chest pain. Has declined offer of cardiac catheterization. Has a severe neuropathy and is seen by neurology. PLAN:    1. Continue present medications  2. Okay for discharge from cardiac standpoint      Please see orders. Discussed with patient and nursing.     Regina Cannon MD     [facility]

## 2018-01-16 LAB — METHYLMALONIC ACID: 0.19 UMOL/L (ref 0–0.4)

## 2018-01-18 NOTE — DISCHARGE SUMMARY
[facility]    Discharge Summary        Patient name:  Oh Cannon    :  1940  Med Rec No:  [id number]    Room:  0715/715-02  Account No:  [unfilled]  Admission date:  2018  Physician:  LINDSAY Mendoza MD    Discharge date:  1/15/2018    Final Diagnoses:    1. Elevated troponin level likely incidental to acute renal failure. 2.  Chronic kidney disease with episode of acute renal failure this admission. 3.  Volume overload secondary to #2 above. 4.  Known coronary artery disease with remote coronary artery bypass grafting and left ventricular systolic dysfunction (ejection fraction approximately 45%)(LIMA to LAD saphenous vein graft to obtuse marginal and distal right coronary artery.)  5. 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 Hypertension  7. Hyperlipidemia  8. Cerebrovascular disease with history of TIA  9. Right eye blindness due to diabetic retinopathy  10. Status post appendectomy, cholecystectomy, partial colectomy, bilateral cataract extraction      DISPOSITION AND RECOMMENDATION:    The patient is discharged home. Outpatient follow-up is arranged. He is to call for any questions or concerns. HISTORY:     This 24-year-old well known gentleman was referred through the emergency room at the [facility] because of an elevated troponin level. Please refer to the dictated H&P for further details. COURSE IN THE HOSPITAL:     Although the patient's troponin level was elevated, he had not been having any chest discomfort. His serum creatinine level was significantly elevated. At the [facility] it was measured at over 2 but by the time he arrived here and the following day it was down to 1.6 already. The patient's troponin was 0.1 here on admission and came down to 0.05 the day before discharge.     The patient was followed by nephrology while here but the patient's acute renal issues

## 2018-02-05 LAB
Lab: NORMAL
REPORT: NORMAL
THIS TEST SENT TO: NORMAL

## 2018-03-09 ENCOUNTER — OFFICE VISIT (OUTPATIENT)
Dept: NEUROLOGY | Age: 78
End: 2018-03-09
Payer: MEDICARE

## 2018-03-09 VITALS
BODY MASS INDEX: 24.25 KG/M2 | WEIGHT: 160 LBS | DIASTOLIC BLOOD PRESSURE: 73 MMHG | SYSTOLIC BLOOD PRESSURE: 145 MMHG | HEIGHT: 68 IN

## 2018-03-09 DIAGNOSIS — Z86.39 HISTORY OF DIABETES MELLITUS: ICD-10-CM

## 2018-03-09 DIAGNOSIS — Z86.79 HISTORY OF HYPERTENSION: ICD-10-CM

## 2018-03-09 DIAGNOSIS — G62.9 NEUROPATHY: Primary | ICD-10-CM

## 2018-03-09 PROCEDURE — 99214 OFFICE O/P EST MOD 30 MIN: CPT | Performed by: PSYCHIATRY & NEUROLOGY

## 2018-03-09 PROCEDURE — 1036F TOBACCO NON-USER: CPT | Performed by: PSYCHIATRY & NEUROLOGY

## 2018-03-09 PROCEDURE — G8482 FLU IMMUNIZE ORDER/ADMIN: HCPCS | Performed by: PSYCHIATRY & NEUROLOGY

## 2018-03-09 PROCEDURE — G8427 DOCREV CUR MEDS BY ELIG CLIN: HCPCS | Performed by: PSYCHIATRY & NEUROLOGY

## 2018-03-09 PROCEDURE — G8420 CALC BMI NORM PARAMETERS: HCPCS | Performed by: PSYCHIATRY & NEUROLOGY

## 2018-03-09 PROCEDURE — 3017F COLORECTAL CA SCREEN DOC REV: CPT | Performed by: PSYCHIATRY & NEUROLOGY

## 2018-03-09 PROCEDURE — G8598 ASA/ANTIPLAT THER USED: HCPCS | Performed by: PSYCHIATRY & NEUROLOGY

## 2018-03-09 PROCEDURE — 4040F PNEUMOC VAC/ADMIN/RCVD: CPT | Performed by: PSYCHIATRY & NEUROLOGY

## 2018-03-09 PROCEDURE — 1123F ACP DISCUSS/DSCN MKR DOCD: CPT | Performed by: PSYCHIATRY & NEUROLOGY

## 2018-03-09 NOTE — PROGRESS NOTES
Sleepiness [] Sleep Apnea  [x] Denies all unless marked       Current Outpatient Prescriptions   Medication Sig Dispense Refill    tamsulosin (FLOMAX) 0.4 MG capsule Take 1 capsule by mouth daily Script for any additional refills should be sent to patients primary care provider 30 capsule 0    Dulaglutide (TRULICITY) 1.5 BG/6.3GE SOPN Inject 1.5 mg into the skin once a week      metoclopramide (REGLAN) 10 MG tablet Take 10 mg by mouth 3 times daily (before meals)      vitamin D (ERGOCALCIFEROL) 92520 units CAPS capsule Take 50,000 Units by mouth once a week      ferrous sulfate 325 (65 Fe) MG tablet Take 325 mg by mouth daily (with breakfast)      gabapentin (NEURONTIN) 300 MG capsule Take 600 mg by mouth 3 times daily .  gemfibrozil (LOPID) 600 MG tablet 600 mg 2 times daily      NOVOLOG 100 UNIT/ML injection Inject 0-40 Units into the skin 3 times daily (before meals)       lisinopril (PRINIVIL;ZESTRIL) 40 MG tablet 40 mg daily.  insulin detemir (LEVEMIR) 100 UNIT/ML injection Inject 0-40 Units into the skin nightly       simvastatin (ZOCOR) 40 MG tablet Take 40 mg by mouth nightly.  sertraline (ZOLOFT) 100 MG tablet Take 150 mg by mouth daily       aspirin EC 81 MG EC tablet Take 81 mg by mouth daily        No current facility-administered medications for this visit.         Outpatient Prescriptions Marked as Taking for the 3/9/18 encounter (Office Visit) with Luz Brooks MD   Medication Sig Dispense Refill    tamsulosin (FLOMAX) 0.4 MG capsule Take 1 capsule by mouth daily Script for any additional refills should be sent to patients primary care provider 30 capsule 0    Dulaglutide (TRULICITY) 1.5 HX/5.7IW SOPN Inject 1.5 mg into the skin once a week      metoclopramide (REGLAN) 10 MG tablet Take 10 mg by mouth 3 times daily (before meals)      vitamin D (ERGOCALCIFEROL) 08814 units CAPS capsule Take 50,000 Units by mouth once a week      ferrous sulfate 325 (65 Fe) MG tablet Take 325 mg by mouth daily (with breakfast)      gabapentin (NEURONTIN) 300 MG capsule Take 600 mg by mouth 3 times daily .  gemfibrozil (LOPID) 600 MG tablet 600 mg 2 times daily      NOVOLOG 100 UNIT/ML injection Inject 0-40 Units into the skin 3 times daily (before meals)       lisinopril (PRINIVIL;ZESTRIL) 40 MG tablet 40 mg daily.  insulin detemir (LEVEMIR) 100 UNIT/ML injection Inject 0-40 Units into the skin nightly       simvastatin (ZOCOR) 40 MG tablet Take 40 mg by mouth nightly.  sertraline (ZOLOFT) 100 MG tablet Take 150 mg by mouth daily       aspirin EC 81 MG EC tablet Take 81 mg by mouth daily          BP (!) 145/73   Ht 5' 8\" (1.727 m)   Wt 160 lb (72.6 kg)   BMI 24.33 kg/m²       Constitutional - well developed, well nourished. Eyes - conjunctiva normal.  Pupils react to light  Ear, nose, throat -hearing intact to finger rub No scars, masses, or lesions over external nose or ears, no atrophy of tongue  Neck-symmetric, no masses noted, no jugular vein distension. No bruits noted. Respiration- chest wall appears symmetric, good expansion,   normal effort without use of accessory muscles  Cardiovascular- RRR  Musculoskeletal - no significant wasting of muscles noted, no bony deformities, gait no gross ataxia  Extremities-no clubbing, cyanosis or edema  Skin - warm, dry, and intact. No rash, erythema, or pallor. Psychiatric - mood, affect, and behavior appear normal.      Neurological exam  Awake, alert, fluent oriented x 3 appropriate affect  Attention and concentration appear appropriate  Recent and remote memory appears unremarkable  Speech normal without dysarthria  No clear issues with language of fund of knowledge    Cranial Nerve Exam   CN II- Visual fields grossly unremarkable. VA adequate.  Discs sharp  CN III, IV,VI- PERRLA, EOMI, No nystagmus, conjugate eye movements, no ptosis  CN VII-no facial asymmetry  CN VIII-Hearing intact   CN IX and X- Palate elevates in

## 2018-03-19 ENCOUNTER — HOSPITAL ENCOUNTER (OUTPATIENT)
Dept: NEUROLOGY | Age: 78
Discharge: HOME | End: 2018-03-19
Payer: MEDICARE

## 2018-03-20 ENCOUNTER — HOSPITAL ENCOUNTER (OUTPATIENT)
Dept: NEUROLOGY | Age: 78
Discharge: HOME | End: 2018-03-20
Payer: MEDICARE

## 2018-03-20 DIAGNOSIS — G62.9 NEUROPATHY: ICD-10-CM

## 2018-03-20 PROCEDURE — 95911 NRV CNDJ TEST 9-10 STUDIES: CPT | Performed by: PSYCHIATRY & NEUROLOGY

## 2018-03-20 PROCEDURE — 95886 MUSC TEST DONE W/N TEST COMP: CPT | Performed by: PSYCHIATRY & NEUROLOGY

## 2018-03-20 PROCEDURE — 95911 NRV CNDJ TEST 9-10 STUDIES: CPT

## 2018-03-20 PROCEDURE — 95886 MUSC TEST DONE W/N TEST COMP: CPT

## 2018-04-17 ENCOUNTER — OFFICE VISIT (OUTPATIENT)
Dept: VASCULAR SURGERY | Age: 78
End: 2018-04-17
Payer: MEDICARE

## 2018-04-17 VITALS
HEART RATE: 69 BPM | TEMPERATURE: 96 F | SYSTOLIC BLOOD PRESSURE: 137 MMHG | DIASTOLIC BLOOD PRESSURE: 79 MMHG | RESPIRATION RATE: 18 BRPM

## 2018-04-17 DIAGNOSIS — I65.23 BILATERAL CAROTID ARTERY STENOSIS: ICD-10-CM

## 2018-04-17 PROCEDURE — G8598 ASA/ANTIPLAT THER USED: HCPCS | Performed by: NURSE PRACTITIONER

## 2018-04-17 PROCEDURE — 1036F TOBACCO NON-USER: CPT | Performed by: NURSE PRACTITIONER

## 2018-04-17 PROCEDURE — G8420 CALC BMI NORM PARAMETERS: HCPCS | Performed by: NURSE PRACTITIONER

## 2018-04-17 PROCEDURE — G8427 DOCREV CUR MEDS BY ELIG CLIN: HCPCS | Performed by: NURSE PRACTITIONER

## 2018-04-17 PROCEDURE — 99214 OFFICE O/P EST MOD 30 MIN: CPT | Performed by: NURSE PRACTITIONER

## 2018-04-17 PROCEDURE — 4040F PNEUMOC VAC/ADMIN/RCVD: CPT | Performed by: NURSE PRACTITIONER

## 2018-04-17 PROCEDURE — 1123F ACP DISCUSS/DSCN MKR DOCD: CPT | Performed by: NURSE PRACTITIONER

## 2018-04-17 RX ORDER — METOPROLOL SUCCINATE 50 MG/1
50 TABLET, EXTENDED RELEASE ORAL DAILY
COMMUNITY
End: 2018-04-18 | Stop reason: SDUPTHER

## 2018-04-18 ENCOUNTER — OFFICE VISIT (OUTPATIENT)
Dept: CARDIOLOGY | Age: 78
End: 2018-04-18
Payer: MEDICARE

## 2018-04-18 VITALS
HEART RATE: 66 BPM | BODY MASS INDEX: 24.4 KG/M2 | DIASTOLIC BLOOD PRESSURE: 68 MMHG | WEIGHT: 161 LBS | SYSTOLIC BLOOD PRESSURE: 130 MMHG | HEIGHT: 68 IN

## 2018-04-18 DIAGNOSIS — Z87.448 HISTORY OF RENAL INSUFFICIENCY SYNDROME: ICD-10-CM

## 2018-04-18 DIAGNOSIS — R60.9 EDEMA, UNSPECIFIED TYPE: ICD-10-CM

## 2018-04-18 DIAGNOSIS — I10 ESSENTIAL HYPERTENSION: ICD-10-CM

## 2018-04-18 DIAGNOSIS — R06.09 DOE (DYSPNEA ON EXERTION): ICD-10-CM

## 2018-04-18 DIAGNOSIS — I25.10 CORONARY ARTERY DISEASE INVOLVING NATIVE CORONARY ARTERY OF NATIVE HEART WITHOUT ANGINA PECTORIS: Primary | ICD-10-CM

## 2018-04-18 DIAGNOSIS — E78.2 MIXED HYPERLIPIDEMIA: ICD-10-CM

## 2018-04-18 DIAGNOSIS — I51.9 LEFT VENTRICULAR DYSFUNCTION: ICD-10-CM

## 2018-04-18 PROCEDURE — G8598 ASA/ANTIPLAT THER USED: HCPCS | Performed by: NURSE PRACTITIONER

## 2018-04-18 PROCEDURE — 4040F PNEUMOC VAC/ADMIN/RCVD: CPT | Performed by: NURSE PRACTITIONER

## 2018-04-18 PROCEDURE — 99213 OFFICE O/P EST LOW 20 MIN: CPT | Performed by: NURSE PRACTITIONER

## 2018-04-18 PROCEDURE — G8420 CALC BMI NORM PARAMETERS: HCPCS | Performed by: NURSE PRACTITIONER

## 2018-04-18 PROCEDURE — G8427 DOCREV CUR MEDS BY ELIG CLIN: HCPCS | Performed by: NURSE PRACTITIONER

## 2018-04-18 PROCEDURE — 1123F ACP DISCUSS/DSCN MKR DOCD: CPT | Performed by: NURSE PRACTITIONER

## 2018-04-18 PROCEDURE — 1036F TOBACCO NON-USER: CPT | Performed by: NURSE PRACTITIONER

## 2018-04-18 RX ORDER — METOPROLOL SUCCINATE 50 MG/1
50 TABLET, EXTENDED RELEASE ORAL DAILY
Qty: 30 TABLET | Refills: 5 | Status: SHIPPED | OUTPATIENT
Start: 2018-04-18

## 2018-04-18 RX ORDER — LISINOPRIL 40 MG/1
40 TABLET ORAL DAILY
Qty: 30 TABLET | Refills: 5 | Status: SHIPPED | OUTPATIENT
Start: 2018-04-18

## 2020-11-03 PROBLEM — I25.10 CAD (CORONARY ARTERY DISEASE): Status: RESOLVED | Noted: 2020-11-03 | Resolved: 2020-11-03
